# Patient Record
Sex: MALE | Race: WHITE | NOT HISPANIC OR LATINO | Employment: OTHER | URBAN - NONMETROPOLITAN AREA
[De-identification: names, ages, dates, MRNs, and addresses within clinical notes are randomized per-mention and may not be internally consistent; named-entity substitution may affect disease eponyms.]

---

## 2017-01-03 RX ORDER — LISINOPRIL 5 MG/1
TABLET ORAL
Qty: 30 TABLET | Refills: 0 | OUTPATIENT
Start: 2017-01-03

## 2017-01-03 RX ORDER — ASPIRIN 81 MG/1
81 TABLET ORAL DAILY
Qty: 60 TABLET | Refills: 8 | Status: SHIPPED | OUTPATIENT
Start: 2017-01-03 | End: 2017-09-18 | Stop reason: SDUPTHER

## 2017-01-03 RX ORDER — LISINOPRIL 5 MG/1
5 TABLET ORAL DAILY
Qty: 30 TABLET | Refills: 11 | Status: SHIPPED | OUTPATIENT
Start: 2017-01-03 | End: 2017-06-08 | Stop reason: SDUPTHER

## 2017-01-10 RX ORDER — VILAZODONE HYDROCHLORIDE 40 MG/1
40 TABLET ORAL DAILY
COMMUNITY
End: 2017-06-08 | Stop reason: SDUPTHER

## 2017-01-10 RX ORDER — GEMFIBROZIL 600 MG/1
600 TABLET, FILM COATED ORAL
COMMUNITY
End: 2017-06-08 | Stop reason: SDUPTHER

## 2017-01-10 RX ORDER — PRAVASTATIN SODIUM 40 MG
40 TABLET ORAL DAILY
COMMUNITY
End: 2017-06-08 | Stop reason: SDUPTHER

## 2017-01-10 RX ORDER — CARVEDILOL 12.5 MG/1
12.5 TABLET ORAL 2 TIMES DAILY WITH MEALS
COMMUNITY
End: 2017-06-08 | Stop reason: SDUPTHER

## 2017-06-08 ENCOUNTER — OFFICE VISIT (OUTPATIENT)
Dept: CARDIOLOGY | Facility: CLINIC | Age: 62
End: 2017-06-08

## 2017-06-08 VITALS
BODY MASS INDEX: 33.93 KG/M2 | WEIGHT: 256 LBS | HEIGHT: 73 IN | DIASTOLIC BLOOD PRESSURE: 76 MMHG | SYSTOLIC BLOOD PRESSURE: 120 MMHG | HEART RATE: 76 BPM

## 2017-06-08 DIAGNOSIS — E78.49 OTHER HYPERLIPIDEMIA: ICD-10-CM

## 2017-06-08 DIAGNOSIS — R00.2 PALPITATION: ICD-10-CM

## 2017-06-08 DIAGNOSIS — I34.0 NON-RHEUMATIC MITRAL REGURGITATION: ICD-10-CM

## 2017-06-08 DIAGNOSIS — I10 ESSENTIAL HYPERTENSION: Primary | ICD-10-CM

## 2017-06-08 PROCEDURE — 99213 OFFICE O/P EST LOW 20 MIN: CPT | Performed by: NURSE PRACTITIONER

## 2017-06-08 RX ORDER — VILAZODONE HYDROCHLORIDE 40 MG/1
40 TABLET ORAL DAILY
Qty: 30 TABLET | Refills: 8 | Status: SHIPPED | OUTPATIENT
Start: 2017-06-08 | End: 2017-12-12 | Stop reason: SDUPTHER

## 2017-06-08 RX ORDER — PRAVASTATIN SODIUM 40 MG
40 TABLET ORAL DAILY
Qty: 30 TABLET | Refills: 8 | Status: SHIPPED | OUTPATIENT
Start: 2017-06-08 | End: 2017-12-12 | Stop reason: SDUPTHER

## 2017-06-08 RX ORDER — CARVEDILOL 12.5 MG/1
12.5 TABLET ORAL 2 TIMES DAILY WITH MEALS
Qty: 60 TABLET | Refills: 8 | Status: SHIPPED | OUTPATIENT
Start: 2017-06-08 | End: 2017-12-12 | Stop reason: SDUPTHER

## 2017-06-08 RX ORDER — LISINOPRIL 5 MG/1
5 TABLET ORAL DAILY
Qty: 30 TABLET | Refills: 8 | Status: SHIPPED | OUTPATIENT
Start: 2017-06-08 | End: 2017-12-12 | Stop reason: SDUPTHER

## 2017-06-08 RX ORDER — GEMFIBROZIL 600 MG/1
600 TABLET, FILM COATED ORAL
Qty: 60 TABLET | Refills: 8 | Status: SHIPPED | OUTPATIENT
Start: 2017-06-08 | End: 2017-12-12 | Stop reason: SDUPTHER

## 2017-06-08 RX ORDER — SILDENAFIL 50 MG/1
50 TABLET, FILM COATED ORAL DAILY PRN
Qty: 2 TABLET | Refills: 0
Start: 2017-06-08 | End: 2017-12-12 | Stop reason: SDUPTHER

## 2017-06-08 NOTE — PROGRESS NOTES
Chief Complaint   Patient presents with   • Follow-up     6 month follow-up, no recent labs, patient brought medication list with visit.       Cardiac Complaints  none      Subjective   Adair Chowdhury is a 62 y.o. male with a history of palpitations for which he wore a Holter monitor in 2001. PVCs were noted and he has been managed with beta blocker. Cardiac workup in the past showed moderate disease of the LAD which has been managed medically. Today he comes to the office for a follow-up visit and no cardiac complaints are voiced. No recent labs have been done and order is requested.  He does have concerns with ED and samples of viagra are requested.  Refills of medication requested.      Cardiac History  Past Surgical History:   Procedure Laterality Date   • CARDIOVASCULAR STRESS TEST  10/13/2005    Stress- 8 min 96% THR. /100 Small Ischemia in Lateral Wall   • CARDIOVASCULAR STRESS TEST  10/14/2009    Stress- 8 min, 97% THR. Negative, Increase Coreg to 12.5 mg BID   • CARDIOVASCULAR STRESS TEST  02/24/2014    Stress- 7 min, 93% THR, 172/90. R/O Inferior Ischemia, Lisinopril added   • CATH LAB PROCEDURE  11/10/2005    Cath- Normal including Renal   • CATH LAB PROCEDURE  03/10/2014    Cath- EF 60%, 30% LAD.   • CONVERTED (HISTORICAL) HOLTER  05/30/2001    Holter-Frequent PVC's   • ECHO - CONVERTED  08/29/2006    Echo- Normal (Dr. Resendez)   • ECHO - CONVERTED  10/14/2009    Echo-EF >60%, aortic root- 4.2cm   • ECHO - CONVERTED  02/24/2014    Echo-EF 60-65%   • HERNIA REPAIR     • TONSILLECTOMY         Current Outpatient Prescriptions   Medication Sig Dispense Refill   • aspirin 81 MG EC tablet Take 1 tablet by mouth Daily. 2 tablets once daily 60 tablet 8   • carvedilol (COREG) 12.5 MG tablet Take 1 tablet by mouth 2 (Two) Times a Day With Meals. 60 tablet 8   • gemfibrozil (LOPID) 600 MG tablet Take 1 tablet by mouth 2 (Two) Times a Day Before Meals. 60 tablet 8   • lisinopril (PRINIVIL,ZESTRIL) 5 MG tablet  Take 1 tablet by mouth Daily. 30 tablet 8   • mometasone (NASONEX) 50 MCG/ACT nasal spray 2 sprays into each nostril daily.     • omeprazole (PriLOSEC) 20 MG capsule Take 20 mg by mouth daily.     • pravastatin (PRAVACHOL) 40 MG tablet Take 1 tablet by mouth Daily. 30 tablet 8   • vilazodone (VIIBRYD) 40 MG tablet tablet Take 1 tablet by mouth Daily. 30 tablet 8   • sildenafil (VIAGRA) 50 MG tablet Take 1 tablet by mouth Daily As Needed for erectile dysfunction. 2 tablet 0     No current facility-administered medications for this visit.        Review of patient's allergies indicates no known allergies.    Past Medical History:   Diagnosis Date   • Abnormal pulse oximetry 03/10/2011    Overnight pulse ox- Negative   • H pylori ulcer    • Heart murmur    • Heart murmur    • History of hernia surgery    • History of tonsillectomy    • Hyperlipidemia    • Hypertension    • LV dysfunction     mild       Social History     Social History   • Marital status:      Spouse name: N/A   • Number of children: N/A   • Years of education: N/A     Occupational History   • Not on file.     Social History Main Topics   • Smoking status: Never Smoker   • Smokeless tobacco: Never Used   • Alcohol use No   • Drug use: No   • Sexual activity: Not on file     Other Topics Concern   • Not on file     Social History Narrative       Family History   Problem Relation Age of Onset   • Heart disease Mother    • Hypertension Mother    • Hyperlipidemia Mother    • Heart disease Father    • Heart attack Father    • Hyperlipidemia Father    • Hypertension Father    • Heart failure Father    • Diabetes Other    • Hypertension Other    • Hyperlipidemia Other    • No Known Problems Child        Review of Systems   Constitutional: Negative.    HENT: Negative.    Respiratory: Negative.    Cardiovascular: Negative.    Endocrine: Negative.    Neurological: Negative.    Psychiatric/Behavioral: Negative.        DiabetesNo  Thyroidnormal    Objective  "    /76 (BP Location: Right arm)  Pulse 76  Ht 73\" (185.4 cm)  Wt 256 lb (116 kg)  BMI 33.78 kg/m2    Physical Exam   Constitutional: He is oriented to person, place, and time. He appears well-developed and well-nourished.   HENT:   Head: Normocephalic and atraumatic.   Eyes: EOM are normal. Pupils are equal, round, and reactive to light.   Neck: Normal range of motion. Neck supple.   Cardiovascular: Normal rate and regular rhythm.    Pulmonary/Chest: Effort normal and breath sounds normal.   Abdominal: Soft.   Musculoskeletal: Normal range of motion.   Neurological: He is alert and oriented to person, place, and time.   Skin: Skin is warm and dry.   Psychiatric: He has a normal mood and affect.       Procedures    Assessment/Plan     HR and BP are both stable today.  No new cardiac testing will be advised today as no new concerns voiced.  Lab order will be given with more recommendations to follow.  We will consider repeat cardiac workup at next visit since length of time since last testing.  Cardiac refills sent.  Good cardiac diet and activity as tolerated advised.  Samples of viagra provided per request for ED.  6 month follow up advised or sooner if needed.      Problems Addressed this Visit        Cardiovascular and Mediastinum    Hyperlipemia    Relevant Medications    pravastatin (PRAVACHOL) 40 MG tablet    gemfibrozil (LOPID) 600 MG tablet    Other Relevant Orders    CBC & Differential    Comprehensive Metabolic Panel    Lipid Panel    TSH    HTN (hypertension) - Primary    Relevant Medications    carvedilol (COREG) 12.5 MG tablet    lisinopril (PRINIVIL,ZESTRIL) 5 MG tablet    Other Relevant Orders    CBC & Differential    Comprehensive Metabolic Panel    Lipid Panel    TSH    Palpitation    Relevant Orders    CBC & Differential    Comprehensive Metabolic Panel    Lipid Panel    TSH    Mitral regurgitation    Relevant Medications    carvedilol (COREG) 12.5 MG tablet    sildenafil (VIAGRA) 50 MG " tablet    Other Relevant Orders    CBC & Differential    Comprehensive Metabolic Panel    Lipid Panel    TSH                  Electronically signed by GLADYS Akers June 8, 2017 4:24 PM

## 2017-09-18 RX ORDER — ASPIRIN 81 MG/1
TABLET ORAL
Qty: 60 TABLET | Refills: 8 | Status: SHIPPED | OUTPATIENT
Start: 2017-09-18 | End: 2018-06-15 | Stop reason: SDUPTHER

## 2017-12-12 ENCOUNTER — OFFICE VISIT (OUTPATIENT)
Dept: CARDIOLOGY | Facility: CLINIC | Age: 62
End: 2017-12-12

## 2017-12-12 VITALS
HEART RATE: 72 BPM | HEIGHT: 73 IN | BODY MASS INDEX: 33.4 KG/M2 | WEIGHT: 252 LBS | SYSTOLIC BLOOD PRESSURE: 120 MMHG | DIASTOLIC BLOOD PRESSURE: 82 MMHG

## 2017-12-12 DIAGNOSIS — I25.10 CORONARY ARTERY DISEASE INVOLVING NATIVE CORONARY ARTERY OF NATIVE HEART WITHOUT ANGINA PECTORIS: ICD-10-CM

## 2017-12-12 DIAGNOSIS — E78.49 OTHER HYPERLIPIDEMIA: ICD-10-CM

## 2017-12-12 DIAGNOSIS — I10 ESSENTIAL HYPERTENSION: Primary | ICD-10-CM

## 2017-12-12 DIAGNOSIS — E66.09 CLASS 1 OBESITY DUE TO EXCESS CALORIES WITHOUT SERIOUS COMORBIDITY WITH BODY MASS INDEX (BMI) OF 33.0 TO 33.9 IN ADULT: ICD-10-CM

## 2017-12-12 PROBLEM — E66.811 CLASS 1 OBESITY DUE TO EXCESS CALORIES WITHOUT SERIOUS COMORBIDITY WITH BODY MASS INDEX (BMI) OF 33.0 TO 33.9 IN ADULT: Status: ACTIVE | Noted: 2017-12-12

## 2017-12-12 PROCEDURE — 99213 OFFICE O/P EST LOW 20 MIN: CPT | Performed by: NURSE PRACTITIONER

## 2017-12-12 RX ORDER — GEMFIBROZIL 600 MG/1
600 TABLET, FILM COATED ORAL
Qty: 60 TABLET | Refills: 8 | Status: SHIPPED | OUTPATIENT
Start: 2017-12-12 | End: 2018-06-13 | Stop reason: SDUPTHER

## 2017-12-12 RX ORDER — CARVEDILOL 12.5 MG/1
12.5 TABLET ORAL 2 TIMES DAILY WITH MEALS
Qty: 60 TABLET | Refills: 8 | Status: SHIPPED | OUTPATIENT
Start: 2017-12-12 | End: 2018-06-13 | Stop reason: SDUPTHER

## 2017-12-12 RX ORDER — SILDENAFIL 50 MG/1
50 TABLET, FILM COATED ORAL DAILY PRN
Qty: 2 TABLET | Refills: 12
Start: 2017-12-12 | End: 2018-06-13

## 2017-12-12 RX ORDER — VILAZODONE HYDROCHLORIDE 40 MG/1
40 TABLET ORAL DAILY
Qty: 30 TABLET | Refills: 8 | Status: SHIPPED | OUTPATIENT
Start: 2017-12-12 | End: 2018-02-19 | Stop reason: SDUPTHER

## 2017-12-12 RX ORDER — PRAVASTATIN SODIUM 40 MG
40 TABLET ORAL DAILY
Qty: 30 TABLET | Refills: 8 | Status: SHIPPED | OUTPATIENT
Start: 2017-12-12 | End: 2018-06-13 | Stop reason: SDUPTHER

## 2017-12-12 RX ORDER — LISINOPRIL 5 MG/1
5 TABLET ORAL DAILY
Qty: 30 TABLET | Refills: 8 | Status: SHIPPED | OUTPATIENT
Start: 2017-12-12 | End: 2018-06-13 | Stop reason: SDUPTHER

## 2017-12-12 NOTE — PATIENT INSTRUCTIONS
Calorie Counting for Weight Loss  Calories are energy you get from the things you eat and drink. Your body uses this energy to keep you going throughout the day. The number of calories you eat affects your weight. When you eat more calories than your body needs, your body stores the extra calories as fat. When you eat fewer calories than your body needs, your body burns fat to get the energy it needs.  Calorie counting means keeping track of how many calories you eat and drink each day. If you make sure to eat fewer calories than your body needs, you should lose weight. In order for calorie counting to work, you will need to eat the number of calories that are right for you in a day to lose a healthy amount of weight per week. A healthy amount of weight to lose per week is usually 1-2 lb (0.5-0.9 kg). A dietitian can determine how many calories you need in a day and give you suggestions on how to reach your calorie goal.   WHAT IS MY MY PLAN?  My goal is to have __________ calories per day.   If I have this many calories per day, I should lose around __________ pounds per week.  WHAT DO I NEED TO KNOW ABOUT CALORIE COUNTING?  In order to meet your daily calorie goal, you will need to:  · Find out how many calories are in each food you would like to eat. Try to do this before you eat.  · Decide how much of the food you can eat.  · Write down what you ate and how many calories it had. Doing this is called keeping a food log.  WHERE DO I FIND CALORIE INFORMATION?  The number of calories in a food can be found on a Nutrition Facts label. Note that all the information on a label is based on a specific serving of the food. If a food does not have a Nutrition Facts label, try to look up the calories online or ask your dietitian for help.  HOW DO I DECIDE HOW MUCH TO EAT?  To decide how much of the food you can eat, you will need to consider both the number of calories in one serving and the size of one serving. This  information can be found on the Nutrition Facts label. If a food does not have a Nutrition Facts label, look up the information online or ask your dietitian for help.  Remember that calories are listed per serving. If you choose to have more than one serving of a food, you will have to multiply the calories per serving by the amount of servings you plan to eat. For example, the label on a package of bread might say that a serving size is 1 slice and that there are 90 calories in a serving. If you eat 1 slice, you will have eaten 90 calories. If you eat 2 slices, you will have eaten 180 calories.  HOW DO I KEEP A FOOD LOG?  After each meal, record the following information in your food log:  · What you ate.  · How much of it you ate.  · How many calories it had.  · Then, add up your calories.  Keep your food log near you, such as in a small notebook in your pocket. Another option is to use a mobile apoorva or website. Some programs will calculate calories for you and show you how many calories you have left each time you add an item to the log.  WHAT ARE SOME CALORIE COUNTING TIPS?  · Use your calories on foods and drinks that will fill you up and not leave you hungry. Some examples of this include foods like nuts and nut butters, vegetables, lean proteins, and high-fiber foods (more than 5 g fiber per serving).  · Eat nutritious foods and avoid empty calories. Empty calories are calories you get from foods or beverages that do not have many nutrients, such as candy and soda. It is better to have a nutritious high-calorie food (such as an avocado) than a food with few nutrients (such as a bag of chips).  · Know how many calories are in the foods you eat most often. This way, you do not have to look up how many calories they have each time you eat them.  · Look out for foods that may seem like low-calorie foods but are really high-calorie foods, such as baked goods, soda, and fat-free candy.  · Pay attention to calories  in drinks. Drinks such as sodas, specialty coffee drinks, alcohol, and juices have a lot of calories yet do not fill you up. Choose low-calorie drinks like water and diet drinks.  · Focus your calorie counting efforts on higher calorie items. Logging the calories in a garden salad that contains only vegetables is less important than calculating the calories in a milk shake.  · Find a way of tracking calories that works for you. Get creative. Most people who are successful find ways to keep track of how much they eat in a day, even if they do not count every calorie.  WHAT ARE SOME PORTION CONTROL TIPS?  · Know how many calories are in a serving. This will help you know how many servings of a certain food you can have.  · Use a measuring cup to measure serving sizes. This is helpful when you start out. With time, you will be able to estimate serving sizes for some foods.  · Take some time to put servings of different foods on your favorite plates, bowls, and cups so you know what a serving looks like.  · Try not to eat straight from a bag or box. Doing this can lead to overeating. Put the amount you would like to eat in a cup or on a plate to make sure you are eating the right portion.  · Use smaller plates, glasses, and bowls to prevent overeating. This is a quick and easy way to practice portion control. If your plate is smaller, less food can fit on it.  · Try not to multitask while eating, such as watching TV or using your computer. If it is time to eat, sit down at a table and enjoy your food. Doing this will help you to start recognizing when you are full. It will also make you more aware of what and how much you are eating.  HOW CAN I CALORIE COUNT WHEN EATING OUT?  · Ask for smaller portion sizes or child-sized portions.  · Consider sharing an entree and sides instead of getting your own entree.  · If you get your own entree, eat only half. Ask for a box at the beginning of your meal and put the rest of your  "entree in it so you are not tempted to eat it.  · Look for the calories on the menu. If calories are listed, choose the lower calorie options.  · Choose dishes that include vegetables, fruits, whole grains, low-fat dairy products, and lean protein. Focusing on smart food choices from each of the 5 food groups can help you stay on track at restaurants.  · Choose items that are boiled, broiled, grilled, or steamed.  · Choose water, milk, unsweetened iced tea, or other drinks without added sugars. If you want an alcoholic beverage, choose a lower calorie option. For example, a regular anderson can have up to 700 calories and a glass of wine has around 150.  · Stay away from items that are buttered, battered, fried, or served with cream sauce. Items labeled \"crispy\" are usually fried, unless stated otherwise.  · Ask for dressings, sauces, and syrups on the side. These are usually very high in calories, so do not eat much of them.  · Watch out for salads. Many people think salads are a healthy option, but this is often not the case. Many salads come with buchanan, fried chicken, lots of cheese, fried chips, and dressing. All of these items have a lot of calories. If you want a salad, choose a garden salad and ask for grilled meats or steak. Ask for the dressing on the side, or ask for olive oil and vinegar or lemon to use as dressing.  · Estimate how many servings of a food you are given. For example, a serving of cooked rice is ½ cup or about the size of half a tennis ball or one cupcake wrapper. Knowing serving sizes will help you be aware of how much food you are eating at restaurants. The list below tells you how big or small some common portion sizes are based on everyday objects.    1 oz--4 stacked dice.    3 oz--1 deck of cards.    1 tsp--1 dice.    1 Tbsp--½ a Ping-Pong ball.    2 Tbsp--1 Ping-Pong ball.    ½ cup--1 tennis ball or 1 cupcake wrapper.    1 cup--1 baseball.     This information is not intended to " replace advice given to you by your health care provider. Make sure you discuss any questions you have with your health care provider.     Document Released: 12/18/2006 Document Revised: 01/08/2016 Document Reviewed: 10/23/2014  [employer] Patient Education ©2017 [employer]

## 2017-12-12 NOTE — PROGRESS NOTES
Chief Complaint   Patient presents with   • Follow-up     cardiac management,    • Med Refill     request 30 day refills on cardiac medication's.        Subjective       Adair Chowdhury is a 62 y.o. male  with a history of palpitations for which he wore a Holter monitor in 2001. PVCs were noted and he has been managed with beta blocker. Cardiac workup in the past showed moderate disease of the LAD which has been managed medically. June 2017 lab report noted. . TSH in normal range.   Today he comes to the office for a follow up visit and no complaints are voiced. He maintains his normal activities without symptoms. No medication changes reported.     HPI         Cardiac History:    Past Surgical History:   Procedure Laterality Date   • CARDIOVASCULAR STRESS TEST  10/13/2005    Stress- 8 min 96% THR. /100 Small Ischemia in Lateral Wall   • CARDIOVASCULAR STRESS TEST  10/14/2009    Stress- 8 min, 97% THR. Negative, Increase Coreg to 12.5 mg BID   • CARDIOVASCULAR STRESS TEST  02/24/2014    Stress- 7 min, 93% THR, 172/90. R/O Inferior Ischemia, Lisinopril added   • CATH LAB PROCEDURE  11/10/2005    Cath- Normal including Renal   • CATH LAB PROCEDURE  03/10/2014    Cath- EF 60%, 30% LAD.   • CONVERTED (HISTORICAL) HOLTER  05/30/2001    Holter-Frequent PVC's   • ECHO - CONVERTED  08/29/2006    Echo- Normal (Dr. Resendez)   • ECHO - CONVERTED  10/14/2009    Echo-EF >60%, aortic root- 4.2cm   • ECHO - CONVERTED  02/24/2014    Echo-EF 60-65%   • HERNIA REPAIR     • TONSILLECTOMY         Current Outpatient Prescriptions   Medication Sig Dispense Refill   • aspirin 81 MG EC tablet TAKE TWO TABLETS BY MOUTH ONCE DAILY 60 tablet 8   • carvedilol (COREG) 12.5 MG tablet Take 1 tablet by mouth 2 (Two) Times a Day With Meals. 60 tablet 8   • gemfibrozil (LOPID) 600 MG tablet Take 1 tablet by mouth 2 (Two) Times a Day Before Meals. 60 tablet 8   • lisinopril (PRINIVIL,ZESTRIL) 5 MG tablet Take 1 tablet by mouth Daily. 30  tablet 8   • mometasone (NASONEX) 50 MCG/ACT nasal spray 2 sprays into each nostril daily.     • omeprazole (PriLOSEC) 20 MG capsule Take 20 mg by mouth daily.     • pravastatin (PRAVACHOL) 40 MG tablet Take 1 tablet by mouth Daily. 30 tablet 8   • sildenafil (VIAGRA) 50 MG tablet Take 1 tablet by mouth Daily As Needed for erectile dysfunction. 2 tablet 12   • vilazodone (VIIBRYD) 40 MG tablet tablet Take 1 tablet by mouth Daily. 30 tablet 8     No current facility-administered medications for this visit.        Review of patient's allergies indicates no known allergies.    Past Medical History:   Diagnosis Date   • Abnormal pulse oximetry 03/10/2011    Overnight pulse ox- Negative   • H pylori ulcer    • Heart murmur    • Heart murmur    • History of hernia surgery    • History of tonsillectomy    • Hyperlipidemia    • Hypertension    • LV dysfunction     mild       Social History     Social History   • Marital status:      Spouse name: N/A   • Number of children: N/A   • Years of education: N/A     Occupational History   • Not on file.     Social History Main Topics   • Smoking status: Never Smoker   • Smokeless tobacco: Never Used   • Alcohol use No   • Drug use: No   • Sexual activity: Not on file     Other Topics Concern   • Not on file     Social History Narrative       Family History   Problem Relation Age of Onset   • Heart disease Mother    • Hypertension Mother    • Hyperlipidemia Mother    • Heart disease Father    • Heart attack Father    • Hyperlipidemia Father    • Hypertension Father    • Heart failure Father    • Diabetes Other    • Hypertension Other    • Hyperlipidemia Other    • No Known Problems Child        Review of Systems   Constitution: Negative for decreased appetite and malaise/fatigue.   HENT: Positive for congestion (sinus). Negative for sore throat.    Eyes: Negative for blurred vision.   Cardiovascular: Negative for chest pain, dyspnea on exertion, irregular heartbeat, leg  "swelling, palpitations and paroxysmal nocturnal dyspnea.   Respiratory: Negative for shortness of breath and sleep disturbances due to breathing.    Endocrine: Negative for cold intolerance and heat intolerance.   Hematologic/Lymphatic: Negative for adenopathy. Does not bruise/bleed easily.   Skin: Negative for itching and nail changes.   Musculoskeletal: Positive for arthritis. Negative for myalgias.   Gastrointestinal: Negative for abdominal pain, dysphagia, heartburn, melena and nausea.   Genitourinary: Negative for dysuria, frequency and hematuria.   Neurological: Negative for dizziness, light-headedness, seizures and vertigo.   Psychiatric/Behavioral: Negative for altered mental status. The patient does not have insomnia and is not nervous/anxious.    Allergic/Immunologic: Negative for environmental allergies and hives.    Diabetes- No  Thyroid-normal    Objective     /82 (BP Location: Right arm)  Pulse 72  Ht 185.4 cm (73\")  Wt 114 kg (252 lb)  BMI 33.25 kg/m2    Physical Exam   Constitutional: He is oriented to person, place, and time. He appears well-nourished.   HENT:   Head: Normocephalic.   Eyes: Conjunctivae are normal. Pupils are equal, round, and reactive to light.   Neck: Normal range of motion. Neck supple. No JVD present. Carotid bruit is not present.   Cardiovascular: Normal rate, regular rhythm, S1 normal, S2 normal and normal pulses.    No murmur heard.  Pulmonary/Chest: Effort normal and breath sounds normal. He has no wheezes. He has no rales.   Abdominal: Soft. Bowel sounds are normal. There is no tenderness.   Musculoskeletal: Normal range of motion. He exhibits no edema or tenderness.   Neurological: He is alert and oriented to person, place, and time.   Skin: Skin is warm and dry. No rash noted. No pallor.   Psychiatric: He has a normal mood and affect. His behavior is normal. Judgment and thought content normal.   Vitals reviewed.     Procedures        Assessment/Plan      Adair" was seen today for follow-up and med refill.    Diagnoses and all orders for this visit:    Essential hypertension    Other hyperlipidemia    Coronary artery disease involving native coronary artery of native heart without angina pectoris    Class 1 obesity due to excess calories without serious comorbidity with body mass index (BMI) of 33.0 to 33.9 in adult    Other orders  -     carvedilol (COREG) 12.5 MG tablet; Take 1 tablet by mouth 2 (Two) Times a Day With Meals.  -     gemfibrozil (LOPID) 600 MG tablet; Take 1 tablet by mouth 2 (Two) Times a Day Before Meals.  -     lisinopril (PRINIVIL,ZESTRIL) 5 MG tablet; Take 1 tablet by mouth Daily.  -     pravastatin (PRAVACHOL) 40 MG tablet; Take 1 tablet by mouth Daily.  -     sildenafil (VIAGRA) 50 MG tablet; Take 1 tablet by mouth Daily As Needed for erectile dysfunction.  -     vilazodone (VIIBRYD) 40 MG tablet tablet; Take 1 tablet by mouth Daily.        He appears stable from a cardiac standpoint. Blood pressure, heart rate and rhythm are normal. No cardiac workup advised at this time. We reviewed his labs from June. His LDL was 110 and triglycerides normal. Continue statin and fenofibrate. Diet for weight loss information given to him. He has been trying to manage diet better and has lost 5 pounds. Next visit will plan to repeat labs unless done sooner.            Electronically signed by GLADYS Alvarez,  December 12, 2017 9:01 AM

## 2018-02-19 ENCOUNTER — PRIOR AUTHORIZATION (OUTPATIENT)
Dept: CARDIOLOGY | Facility: CLINIC | Age: 63
End: 2018-02-19

## 2018-02-19 RX ORDER — VILAZODONE HYDROCHLORIDE 40 MG/1
40 TABLET ORAL DAILY
Qty: 30 TABLET | Refills: 11 | Status: SHIPPED | OUTPATIENT
Start: 2018-02-19 | End: 2018-06-13 | Stop reason: SDUPTHER

## 2018-06-13 ENCOUNTER — OFFICE VISIT (OUTPATIENT)
Dept: CARDIOLOGY | Facility: CLINIC | Age: 63
End: 2018-06-13

## 2018-06-13 VITALS
DIASTOLIC BLOOD PRESSURE: 88 MMHG | WEIGHT: 244 LBS | BODY MASS INDEX: 32.34 KG/M2 | HEIGHT: 73 IN | HEART RATE: 72 BPM | SYSTOLIC BLOOD PRESSURE: 130 MMHG

## 2018-06-13 DIAGNOSIS — I34.0 NON-RHEUMATIC MITRAL REGURGITATION: ICD-10-CM

## 2018-06-13 DIAGNOSIS — I10 ESSENTIAL HYPERTENSION: Primary | ICD-10-CM

## 2018-06-13 DIAGNOSIS — R35.1 NOCTURIA: ICD-10-CM

## 2018-06-13 DIAGNOSIS — Z79.899 MEDICATION MANAGEMENT: ICD-10-CM

## 2018-06-13 DIAGNOSIS — E78.2 MIXED HYPERLIPIDEMIA: ICD-10-CM

## 2018-06-13 DIAGNOSIS — I25.10 CORONARY ARTERY DISEASE INVOLVING NATIVE CORONARY ARTERY OF NATIVE HEART WITHOUT ANGINA PECTORIS: ICD-10-CM

## 2018-06-13 DIAGNOSIS — Z12.5 SCREENING PSA (PROSTATE SPECIFIC ANTIGEN): ICD-10-CM

## 2018-06-13 DIAGNOSIS — E78.00 PURE HYPERCHOLESTEROLEMIA: ICD-10-CM

## 2018-06-13 PROCEDURE — 99214 OFFICE O/P EST MOD 30 MIN: CPT | Performed by: NURSE PRACTITIONER

## 2018-06-13 RX ORDER — GEMFIBROZIL 600 MG/1
600 TABLET, FILM COATED ORAL
Qty: 60 TABLET | Refills: 11 | Status: SHIPPED | OUTPATIENT
Start: 2018-06-13 | End: 2018-12-13 | Stop reason: SDUPTHER

## 2018-06-13 RX ORDER — VILAZODONE HYDROCHLORIDE 40 MG/1
40 TABLET ORAL DAILY
Qty: 30 TABLET | Refills: 11 | Status: SHIPPED | OUTPATIENT
Start: 2018-06-13 | End: 2018-12-13 | Stop reason: SDUPTHER

## 2018-06-13 RX ORDER — SILDENAFIL 100 MG/1
100 TABLET, FILM COATED ORAL AS NEEDED
Qty: 8 TABLET | Refills: 6 | Status: SHIPPED | OUTPATIENT
Start: 2018-06-13 | End: 2018-12-13 | Stop reason: ALTCHOICE

## 2018-06-13 RX ORDER — PRAVASTATIN SODIUM 40 MG
40 TABLET ORAL DAILY
Qty: 30 TABLET | Refills: 11 | Status: SHIPPED | OUTPATIENT
Start: 2018-06-13 | End: 2018-12-13 | Stop reason: SDUPTHER

## 2018-06-13 RX ORDER — LISINOPRIL 5 MG/1
5 TABLET ORAL DAILY
Qty: 30 TABLET | Refills: 11 | Status: SHIPPED | OUTPATIENT
Start: 2018-06-13 | End: 2018-12-13 | Stop reason: SDUPTHER

## 2018-06-13 RX ORDER — CARVEDILOL 12.5 MG/1
12.5 TABLET ORAL 2 TIMES DAILY WITH MEALS
Qty: 60 TABLET | Refills: 11 | Status: SHIPPED | OUTPATIENT
Start: 2018-06-13 | End: 2018-12-13 | Stop reason: SDUPTHER

## 2018-06-13 NOTE — PROGRESS NOTES
Chief Complaint   Patient presents with   • Follow-up     For cardiac management. Patient is on aspirin. Last lab work was done on 06/09/17 per Fidelia, in chart under media. Reports he occasionally has palpitations, but reports that they have slowed down.    • Med Refill     Needs refills on cardiac medications. 30 day supplys to [facility] in [place].        Subjective       Adair Chowdhury is a 63 y.o. male   with a history of palpitations for which he wore a Holter monitor in 2001. PVCs were noted and he has been managed with beta blocker. Cardiac workup in 2014 showed moderate disease of the LAD which has been managed medically. June 2017 lab report showed . TSH in normal range.   Today he comes to the office for a follow up visit and denies chest pain or shortness of breath. Occasionally, he feels palpitations that are brief and infrequent, same as before. No recent medication changes reported. He maintains his normal activities without issue.     HPI     Cardiac History:    Past Surgical History:   Procedure Laterality Date   • CARDIOVASCULAR STRESS TEST  10/13/2005    Stress- 8 min 96% THR. /100 Small Ischemia in Lateral Wall   • CARDIOVASCULAR STRESS TEST  10/14/2009    Stress- 8 min, 97% THR. Negative, Increase Coreg to 12.5 mg BID   • CARDIOVASCULAR STRESS TEST  02/24/2014    Stress- 7 min, 93% THR, 172/90. R/O Inferior Ischemia, Lisinopril added   • CATH LAB PROCEDURE  11/10/2005    Cath- Normal including Renal   • CATH LAB PROCEDURE  03/10/2014    Cath- EF 60%, 30% LAD.   • CONVERTED (HISTORICAL) HOLTER  05/30/2001    Holter-Frequent PVC's   • ECHO - CONVERTED  08/29/2006    Echo- Normal (Dr. Resendez)   • ECHO - CONVERTED  10/14/2009    Echo-EF >60%, aortic root- 4.2cm   • ECHO - CONVERTED  02/24/2014    Echo-EF 60-65%   • HERNIA REPAIR     • TONSILLECTOMY         Current Outpatient Prescriptions   Medication Sig Dispense Refill   • aspirin 81 MG EC tablet TAKE TWO TABLETS BY MOUTH  ONCE DAILY 60 tablet 8   • carvedilol (COREG) 12.5 MG tablet Take 1 tablet by mouth 2 (Two) Times a Day With Meals. 60 tablet 11   • gemfibrozil (LOPID) 600 MG tablet Take 1 tablet by mouth 2 (Two) Times a Day Before Meals. 60 tablet 11   • lisinopril (PRINIVIL,ZESTRIL) 5 MG tablet Take 1 tablet by mouth Daily. 30 tablet 11   • mometasone (NASONEX) 50 MCG/ACT nasal spray 2 sprays into each nostril daily.     • omeprazole (PriLOSEC) 20 MG capsule Take 20 mg by mouth daily.     • pravastatin (PRAVACHOL) 40 MG tablet Take 1 tablet by mouth Daily. 30 tablet 11   • vilazodone (VIIBRYD) 40 MG tablet tablet Take 1 tablet by mouth Daily. 30 tablet 11   • sildenafil (VIAGRA) 100 MG tablet Take 1 tablet by mouth As Needed for erectile dysfunction. 8 tablet 6     No current facility-administered medications for this visit.        Patient has no known allergies.    Past Medical History:   Diagnosis Date   • Abnormal pulse oximetry 03/10/2011    Overnight pulse ox- Negative   • H pylori ulcer    • Heart murmur    • Heart murmur    • History of hernia surgery    • History of tonsillectomy    • Hyperlipidemia    • Hypertension    • LV dysfunction     mild       Social History     Social History   • Marital status:      Spouse name: N/A   • Number of children: N/A   • Years of education: N/A     Occupational History   • Not on file.     Social History Main Topics   • Smoking status: Never Smoker   • Smokeless tobacco: Never Used   • Alcohol use No   • Drug use: No   • Sexual activity: Not on file     Other Topics Concern   • Not on file     Social History Narrative   • No narrative on file       Family History   Problem Relation Age of Onset   • Heart disease Mother    • Hypertension Mother    • Hyperlipidemia Mother    • Heart disease Father    • Heart attack Father    • Hyperlipidemia Father    • Hypertension Father    • Heart failure Father    • Diabetes Other    • Hypertension Other    • Hyperlipidemia Other    • No  "Known Problems Child        Review of Systems   Constitution: Negative for decreased appetite and malaise/fatigue.   HENT: Positive for congestion (mild sinus, relates to allergies). Negative for hoarse voice and nosebleeds.    Eyes: Negative for blurred vision and redness.   Cardiovascular: Positive for palpitations. Negative for chest pain, dyspnea on exertion, leg swelling and near-syncope.   Respiratory: Negative for shortness of breath and snoring.    Endocrine: Negative for polydipsia, polyphagia and polyuria.   Hematologic/Lymphatic: Negative for adenopathy. Does not bruise/bleed easily.   Skin: Negative for color change, dry skin and itching.   Musculoskeletal: Positive for joint pain (\"arthritis\" mild). Negative for muscle cramps.   Gastrointestinal: Negative for abdominal pain, change in bowel habit, dysphagia, heartburn, melena and nausea.   Genitourinary: Negative for dysuria and hematuria.   Neurological: Negative for dizziness and light-headedness.   Psychiatric/Behavioral: Negative for altered mental status. The patient does not have insomnia.         Objective     /88 (BP Location: Left arm)   Pulse 72   Ht 185.4 cm (72.99\")   Wt 111 kg (244 lb)   BMI 32.20 kg/m²     Physical Exam   Constitutional: He is oriented to person, place, and time. He appears well-nourished.   HENT:   Head: Normocephalic.   Eyes: Conjunctivae are normal. Pupils are equal, round, and reactive to light.   Neck: Normal range of motion. Neck supple. No JVD present. Carotid bruit is not present.   Cardiovascular: Normal rate, regular rhythm, S1 normal and S2 normal.    Murmur heard.   Systolic murmur is present with a grade of 2/6   Pulmonary/Chest: Effort normal and breath sounds normal. He has no wheezes. He has no rales.   Abdominal: Soft. Bowel sounds are normal.   Musculoskeletal: Normal range of motion. He exhibits no edema.   Neurological: He is alert and oriented to person, place, and time.   Skin: Skin is warm " and dry. No pallor.   Psychiatric: He has a normal mood and affect. His behavior is normal.   Vitals reviewed.     Procedures: none today      Assessment/Plan      Adair was seen today for follow-up and med refill.    Diagnoses and all orders for this visit:    Essential hypertension  -     Comprehensive Metabolic Panel; Future  -     CBC (No Diff); Future    Pure hypercholesterolemia  -     Lipid Panel; Future    Non-rheumatic mitral regurgitation    Coronary artery disease involving native coronary artery of native heart without angina pectoris    Mixed hyperlipidemia  -     Comprehensive Metabolic Panel; Future  -     Lipid Panel; Future    Medication management  -     Comprehensive Metabolic Panel; Future  -     CBC (No Diff); Future  -     Lipid Panel; Future    Screening PSA (prostate specific antigen)  -     PSA Screen; Future    Nocturia  -     PSA Screen; Future    Other orders  -     sildenafil (VIAGRA) 100 MG tablet; Take 1 tablet by mouth As Needed for erectile dysfunction.  -     pravastatin (PRAVACHOL) 40 MG tablet; Take 1 tablet by mouth Daily.  -     lisinopril (PRINIVIL,ZESTRIL) 5 MG tablet; Take 1 tablet by mouth Daily.  -     gemfibrozil (LOPID) 600 MG tablet; Take 1 tablet by mouth 2 (Two) Times a Day Before Meals.  -     carvedilol (COREG) 12.5 MG tablet; Take 1 tablet by mouth 2 (Two) Times a Day With Meals.  -     vilazodone (VIIBRYD) 40 MG tablet tablet; Take 1 tablet by mouth Daily.        Patient's Body mass index is 32.2 kg/m². BMI is above normal parameters. Recommendations include: nutrition counseling. Diet for weight loss, BMI and DASH diet information given to him. His weight is down about 10 pounds and I encouraged his efforts. He maintains normal activity. I encouraged daily low impact cardiac exercise.      His diastolic blood pressure slightly increased compared to his normal. He agrees to monitor. No medication changes advised today. Refills given as noted above.    A lab order  given as noted above. For lipid management he is taking Lopid and statin without side effects noted. Continue same, further recommendations based on lab results.     His last PSA in 2016. A repeat PSA advised.     He remains asymptomatic. No repeat cardiac testing ordered today as patient has no cardiac concerns and wants to avoid out of pocket cost.     A 6 month follow up visit scheduled.            Electronically signed by GLADYS Alvarez,  June 13, 2018 4:46 PM

## 2018-06-13 NOTE — PATIENT INSTRUCTIONS
"DASH Eating Plan  DASH stands for \"Dietary Approaches to Stop Hypertension.\" The DASH eating plan is a healthy eating plan that has been shown to reduce high blood pressure (hypertension). It may also reduce your risk for type 2 diabetes, heart disease, and stroke. The DASH eating plan may also help with weight loss.  What are tips for following this plan?  General guidelines   · Avoid eating more than 2,300 mg (milligrams) of salt (sodium) a day. If you have hypertension, you may need to reduce your sodium intake to 1,500 mg a day.  · Limit alcohol intake to no more than 1 drink a day for nonpregnant women and 2 drinks a day for men. One drink equals 12 oz of beer, 5 oz of wine, or 1½ oz of hard liquor.  · Work with your health care provider to maintain a healthy body weight or to lose weight. Ask what an ideal weight is for you.  · Get at least 30 minutes of exercise that causes your heart to beat faster (aerobic exercise) most days of the week. Activities may include walking, swimming, or biking.  · Work with your health care provider or diet and nutrition specialist (dietitian) to adjust your eating plan to your individual calorie needs.  Reading food labels   · Check food labels for the amount of sodium per serving. Choose foods with less than 5 percent of the Daily Value of sodium. Generally, foods with less than 300 mg of sodium per serving fit into this eating plan.  · To find whole grains, look for the word \"whole\" as the first word in the ingredient list.  Shopping   · Buy products labeled as \"low-sodium\" or \"no salt added.\"  · Buy fresh foods. Avoid canned foods and premade or frozen meals.  Cooking   · Avoid adding salt when cooking. Use salt-free seasonings or herbs instead of table salt or sea salt. Check with your health care provider or pharmacist before using salt substitutes.  · Do not echevarria foods. Cook foods using healthy methods such as baking, boiling, grilling, and broiling instead.  · Cook with " heart-healthy oils, such as olive, canola, soybean, or sunflower oil.  Meal planning     · Eat a balanced diet that includes:  ¨ 5 or more servings of fruits and vegetables each day. At each meal, try to fill half of your plate with fruits and vegetables.  ¨ Up to 6-8 servings of whole grains each day.  ¨ Less than 6 oz of lean meat, poultry, or fish each day. A 3-oz serving of meat is about the same size as a deck of cards. One egg equals 1 oz.  ¨ 2 servings of low-fat dairy each day.  ¨ A serving of nuts, seeds, or beans 5 times each week.  ¨ Heart-healthy fats. Healthy fats called Omega-3 fatty acids are found in foods such as flaxseeds and coldwater fish, like sardines, salmon, and mackerel.  · Limit how much you eat of the following:  ¨ Canned or prepackaged foods.  ¨ Food that is high in trans fat, such as fried foods.  ¨ Food that is high in saturated fat, such as fatty meat.  ¨ Sweets, desserts, sugary drinks, and other foods with added sugar.  ¨ Full-fat dairy products.  · Do not salt foods before eating.  · Try to eat at least 2 vegetarian meals each week.  · Eat more home-cooked food and less restaurant, buffet, and fast food.  · When eating at a restaurant, ask that your food be prepared with less salt or no salt, if possible.  What foods are recommended?  The items listed may not be a complete list. Talk with your dietitian about what dietary choices are best for you.  Grains   Whole-grain or whole-wheat bread. Whole-grain or whole-wheat pasta. Brown rice. Oatmeal. Quinoa. Bulgur. Whole-grain and low-sodium cereals. Zari bread. Low-fat, low-sodium crackers. Whole-wheat flour tortillas.  Vegetables   Fresh or frozen vegetables (raw, steamed, roasted, or grilled). Low-sodium or reduced-sodium tomato and vegetable juice. Low-sodium or reduced-sodium tomato sauce and tomato paste. Low-sodium or reduced-sodium canned vegetables.  Fruits   All fresh, dried, or frozen fruit. Canned fruit in natural juice  (without added sugar).  Meat and other protein foods   Skinless chicken or turkey. Ground chicken or turkey. Pork with fat trimmed off. Fish and seafood. Egg whites. Dried beans, peas, or lentils. Unsalted nuts, nut butters, and seeds. Unsalted canned beans. Lean cuts of beef with fat trimmed off. Low-sodium, lean deli meat.  Dairy   Low-fat (1%) or fat-free (skim) milk. Fat-free, low-fat, or reduced-fat cheeses. Nonfat, low-sodium ricotta or cottage cheese. Low-fat or nonfat yogurt. Low-fat, low-sodium cheese.  Fats and oils   Soft margarine without trans fats. Vegetable oil. Low-fat, reduced-fat, or light mayonnaise and salad dressings (reduced-sodium). Canola, safflower, olive, soybean, and sunflower oils. Avocado.  Seasoning and other foods   Herbs. Spices. Seasoning mixes without salt. Unsalted popcorn and pretzels. Fat-free sweets.  What foods are not recommended?  The items listed may not be a complete list. Talk with your dietitian about what dietary choices are best for you.  Grains   Baked goods made with fat, such as croissants, muffins, or some breads. Dry pasta or rice meal packs.  Vegetables   Creamed or fried vegetables. Vegetables in a cheese sauce. Regular canned vegetables (not low-sodium or reduced-sodium). Regular canned tomato sauce and paste (not low-sodium or reduced-sodium). Regular tomato and vegetable juice (not low-sodium or reduced-sodium). Pickles. Olives.  Fruits   Canned fruit in a light or heavy syrup. Fried fruit. Fruit in cream or butter sauce.  Meat and other protein foods   Fatty cuts of meat. Ribs. Fried meat. Chaves. Sausage. Bologna and other processed lunch meats. Salami. Fatback. Hotdogs. Bratwurst. Salted nuts and seeds. Canned beans with added salt. Canned or smoked fish. Whole eggs or egg yolks. Chicken or turkey with skin.  Dairy   Whole or 2% milk, cream, and half-and-half. Whole or full-fat cream cheese. Whole-fat or sweetened yogurt. Full-fat cheese. Nondairy creamers.  Whipped toppings. Processed cheese and cheese spreads.  Fats and oils   Butter. Stick margarine. Lard. Shortening. Ghee. Chaves fat. Tropical oils, such as coconut, palm kernel, or palm oil.  Seasoning and other foods   Salted popcorn and pretzels. Onion salt, garlic salt, seasoned salt, table salt, and sea salt. Worcestershire sauce. Tartar sauce. Barbecue sauce. Teriyaki sauce. Soy sauce, including reduced-sodium. Steak sauce. Canned and packaged gravies. Fish sauce. Oyster sauce. Cocktail sauce. Horseradish that you find on the shelf. Ketchup. Mustard. Meat flavorings and tenderizers. Bouillon cubes. Hot sauce and Tabasco sauce. Premade or packaged marinades. Premade or packaged taco seasonings. Relishes. Regular salad dressings.  Where to find more information:  · National Heart, Lung, and Blood [place]: www.nhlbi.nih.gov  · American Heart Association: www.heart.org  Summary  · The DASH eating plan is a healthy eating plan that has been shown to reduce high blood pressure (hypertension). It may also reduce your risk for type 2 diabetes, heart disease, and stroke.  · With the DASH eating plan, you should limit salt (sodium) intake to 2,300 mg a day. If you have hypertension, you may need to reduce your sodium intake to 1,500 mg a day.  · When on the DASH eating plan, aim to eat more fresh fruits and vegetables, whole grains, lean proteins, low-fat dairy, and heart-healthy fats.  · Work with your health care provider or diet and nutrition specialist (dietitian) to adjust your eating plan to your individual calorie needs.  This information is not intended to replace advice given to you by your health care provider. Make sure you discuss any questions you have with your health care provider.  Document Released: 12/06/2012 Document Revised: 12/11/2017 Document Reviewed: 12/11/2017  [employer] Interactive Patient Education © 2017 [employer]  BMI for Adults  Body mass index (BMI) is a number that is calculated from a  person's weight and height. In most adults, the number is used to find how much of an adult's weight is made up of fat. BMI is not as accurate as a direct measure of body fat.  How is BMI calculated?  BMI is calculated by dividing weight in kilograms by height in meters squared. It can also be calculated by dividing weight in pounds by height in inches squared, then multiplying the resulting number by 703. Charts are available to help you find your BMI quickly and easily without doing this calculation.  How is BMI interpreted?  Health care professionals use BMI charts to identify whether an adult is underweight, at a normal weight, or overweight based on the following guidelines:  · Underweight: BMI less than 18.5.  · Normal weight: BMI between 18.5 and 24.9.  · Overweight: BMI between 25 and 29.9.  · Obese: BMI of 30 and above.  BMI is usually interpreted the same for males and females.  Weight includes both fat and muscle, so someone with a muscular build, such as an athlete, may have a BMI that is higher than 24.9. In cases like these, BMI may not accurately depict body fat. To determine if excess body fat is the cause of a BMI of 25 or higher, further assessments may need to be done by a health care provider.  Why is BMI a useful tool?  BMI is used to identify a possible weight problem that may be related to a medical problem or may increase the risk for medical problems. BMI can also be used to promote changes to reach a healthy weight.  This information is not intended to replace advice given to you by your health care provider. Make sure you discuss any questions you have with your health care provider.  Document Released: 08/29/2005 Document Revised: 04/27/2017 Document Reviewed: 05/15/2015  [employer] Interactive Patient Education © 2017 [employer]  Calorie Counting for Weight Loss  Calories are units of energy. Your body needs a certain amount of calories from food to keep you going throughout the day.  When you eat more calories than your body needs, your body stores the extra calories as fat. When you eat fewer calories than your body needs, your body burns fat to get the energy it needs.  Calorie counting means keeping track of how many calories you eat and drink each day. Calorie counting can be helpful if you need to lose weight. If you make sure to eat fewer calories than your body needs, you should lose weight. Ask your health care provider what a healthy weight is for you.  For calorie counting to work, you will need to eat the right number of calories in a day in order to lose a healthy amount of weight per week. A dietitian can help you determine how many calories you need in a day and will give you suggestions on how to reach your calorie goal.  · A healthy amount of weight to lose per week is usually 1-2 lb (0.5-0.9 kg). This usually means that your daily calorie intake should be reduced by 500-750 calories.  · Eating 1,200 - 1,500 calories per day can help most women lose weight.  · Eating 1,500 - 1,800 calories per day can help most men lose weight.  What is my plan?  My goal is to have __________ calories per day.  If I have this many calories per day, I should lose around __________ pounds per week.  What do I need to know about calorie counting?  In order to meet your daily calorie goal, you will need to:  · Find out how many calories are in each food you would like to eat. Try to do this before you eat.  · Decide how much of the food you plan to eat.  · Write down what you ate and how many calories it had. Doing this is called keeping a food log.  To successfully lose weight, it is important to balance calorie counting with a healthy lifestyle that includes regular activity. Aim for 150 minutes of moderate exercise (such as walking) or 75 minutes of vigorous exercise (such as running) each week.  Where do I find calorie information?     The number of calories in a food can be found on a Nutrition  Facts label. If a food does not have a Nutrition Facts label, try to look up the calories online or ask your dietitian for help.  Remember that calories are listed per serving. If you choose to have more than one serving of a food, you will have to multiply the calories per serving by the amount of servings you plan to eat. For example, the label on a package of bread might say that a serving size is 1 slice and that there are 90 calories in a serving. If you eat 1 slice, you will have eaten 90 calories. If you eat 2 slices, you will have eaten 180 calories.  How do I keep a food log?  Immediately after each meal, record the following information in your food log:  · What you ate. Don't forget to include toppings, sauces, and other extras on the food.  · How much you ate. This can be measured in cups, ounces, or number of items.  · How many calories each food and drink had.  · The total number of calories in the meal.  Keep your food log near you, such as in a small notebook in your pocket, or use a mobile apoorva or website. Some programs will calculate calories for you and show you how many calories you have left for the day to meet your goal.  What are some calorie counting tips?  · Use your calories on foods and drinks that will fill you up and not leave you hungry:  ¨ Some examples of foods that fill you up are nuts and nut butters, vegetables, lean proteins, and high-fiber foods like whole grains. High-fiber foods are foods with more than 5 g fiber per serving.  ¨ Drinks such as sodas, specialty coffee drinks, alcohol, and juices have a lot of calories, yet do not fill you up.  · Eat nutritious foods and avoid empty calories. Empty calories are calories you get from foods or beverages that do not have many vitamins or protein, such as candy, sweets, and soda. It is better to have a nutritious high-calorie food (such as an avocado) than a food with few nutrients (such as a bag of chips).  · Know how many calories  "are in the foods you eat most often. This will help you calculate calorie counts faster.  · Pay attention to calories in drinks. Low-calorie drinks include water and unsweetened drinks.  · Pay attention to nutrition labels for \"low fat\" or \"fat free\" foods. These foods sometimes have the same amount of calories or more calories than the full fat versions. They also often have added sugar, starch, or salt, to make up for flavor that was removed with the fat.  · Find a way of tracking calories that works for you. Get creative. Try different apps or programs if writing down calories does not work for you.  What are some portion control tips?  · Know how many calories are in a serving. This will help you know how many servings of a certain food you can have.  · Use a measuring cup to measure serving sizes. You could also try weighing out portions on a kitchen scale. With time, you will be able to estimate serving sizes for some foods.  · Take some time to put servings of different foods on your favorite plates, bowls, and cups so you know what a serving looks like.  · Try not to eat straight from a bag or box. Doing this can lead to overeating. Put the amount you would like to eat in a cup or on a plate to make sure you are eating the right portion.  · Use smaller plates, glasses, and bowls to prevent overeating.  · Try not to multitask (for example, watch TV or use your computer) while eating. If it is time to eat, sit down at a table and enjoy your food. This will help you to know when you are full. It will also help you to be aware of what you are eating and how much you are eating.  What are tips for following this plan?  Reading food labels   · Check the calorie count compared to the serving size. The serving size may be smaller than what you are used to eating.  · Check the source of the calories. Make sure the food you are eating is high in vitamins and protein and low in saturated and trans fats.  Shopping "   · Read nutrition labels while you shop. This will help you make healthy decisions before you decide to purchase your food.  · Make a grocery list and stick to it.  Cooking   · Try to cook your favorite foods in a healthier way. For example, try baking instead of frying.  · Use low-fat dairy products.  Meal planning   · Use more fruits and vegetables. Half of your plate should be fruits and vegetables.  · Include lean proteins like poultry and fish.  How do I count calories when eating out?  · Ask for smaller portion sizes.  · Consider sharing an entree and sides instead of getting your own entree.  · If you get your own entree, eat only half. Ask for a box at the beginning of your meal and put the rest of your entree in it so you are not tempted to eat it.  · If calories are listed on the menu, choose the lower calorie options.  · Choose dishes that include vegetables, fruits, whole grains, low-fat dairy products, and lean protein.  · Choose items that are boiled, broiled, grilled, or steamed. Stay away from items that are buttered, battered, fried, or served with cream sauce. Items labeled “crispy” are usually fried, unless stated otherwise.  · Choose water, low-fat milk, unsweetened iced tea, or other drinks without added sugar. If you want an alcoholic beverage, choose a lower calorie option such as a glass of wine or light beer.  · Ask for dressings, sauces, and syrups on the side. These are usually high in calories, so you should limit the amount you eat.  · If you want a salad, choose a garden salad and ask for grilled meats. Avoid extra toppings like buchanan, cheese, or fried items. Ask for the dressing on the side, or ask for olive oil and vinegar or lemon to use as dressing.  · Estimate how many servings of a food you are given. For example, a serving of cooked rice is ½ cup or about the size of half a baseball. Knowing serving sizes will help you be aware of how much food you are eating at restaurants. The  list below tells you how big or small some common portion sizes are based on everyday objects:  ¨ 1 oz--4 stacked dice.  ¨ 3 oz--1 deck of cards.  ¨ 1 tsp--1 die.  ¨ 1 Tbsp--½ a ping-pong ball.  ¨ 2 Tbsp--1 ping-pong ball.  ¨ ½ cup--½ baseball.  ¨ 1 cup--1 baseball.  Summary  · Calorie counting means keeping track of how many calories you eat and drink each day. If you eat fewer calories than your body needs, you should lose weight.  · A healthy amount of weight to lose per week is usually 1-2 lb (0.5-0.9 kg). This usually means reducing your daily calorie intake by 500-750 calories.  · The number of calories in a food can be found on a Nutrition Facts label. If a food does not have a Nutrition Facts label, try to look up the calories online or ask your dietitian for help.  · Use your calories on foods and drinks that will fill you up, and not on foods and drinks that will leave you hungry.  · Use smaller plates, glasses, and bowls to prevent overeating.  This information is not intended to replace advice given to you by your health care provider. Make sure you discuss any questions you have with your health care provider.  Document Released: 12/18/2006 Document Revised: 11/17/2017 Document Reviewed: 11/17/2017  [employer] Interactive Patient Education © 2017 [employer]

## 2018-06-14 ENCOUNTER — TELEPHONE (OUTPATIENT)
Dept: CARDIOLOGY | Facility: CLINIC | Age: 63
End: 2018-06-14

## 2018-06-14 NOTE — TELEPHONE ENCOUNTER
Agree to change prescription to 20 mg 3 to 5 tablets as needed. Most likely quanity of 30 and can give refill. Thanks.

## 2018-06-14 NOTE — TELEPHONE ENCOUNTER
Qamar with [facility] called and asked if sildenafil could be changed from 100 mg as needed for ED to sildenafil 20 mg 3 to 5 tablets as needed for ED could be sent because it would be cheaper for patient.       [phone]

## 2018-06-15 RX ORDER — ASPIRIN 81 MG/1
TABLET ORAL
Qty: 90 TABLET | Refills: 1 | Status: SHIPPED | OUTPATIENT
Start: 2018-06-15 | End: 2019-12-17 | Stop reason: SDUPTHER

## 2018-06-18 ENCOUNTER — LAB (OUTPATIENT)
Dept: LAB | Facility: HOSPITAL | Age: 63
End: 2018-06-18

## 2018-06-18 DIAGNOSIS — Z12.5 SCREENING PSA (PROSTATE SPECIFIC ANTIGEN): ICD-10-CM

## 2018-06-18 DIAGNOSIS — Z79.899 MEDICATION MANAGEMENT: ICD-10-CM

## 2018-06-18 DIAGNOSIS — I10 ESSENTIAL HYPERTENSION: ICD-10-CM

## 2018-06-18 DIAGNOSIS — R35.1 NOCTURIA: ICD-10-CM

## 2018-06-18 DIAGNOSIS — E78.2 MIXED HYPERLIPIDEMIA: ICD-10-CM

## 2018-06-18 DIAGNOSIS — E78.00 PURE HYPERCHOLESTEROLEMIA: ICD-10-CM

## 2018-06-18 LAB
ALBUMIN SERPL-MCNC: 5 G/DL (ref 3.4–4.8)
ALBUMIN/GLOB SERPL: 2 G/DL (ref 1.5–2.5)
ALP SERPL-CCNC: 106 U/L (ref 40–129)
ALT SERPL W P-5'-P-CCNC: 25 U/L (ref 10–44)
ANION GAP SERPL CALCULATED.3IONS-SCNC: 9.1 MMOL/L (ref 3.6–11.2)
AST SERPL-CCNC: 25 U/L (ref 10–34)
BILIRUB SERPL-MCNC: 0.5 MG/DL (ref 0.2–1.8)
BUN BLD-MCNC: 11 MG/DL (ref 7–21)
BUN/CREAT SERPL: 10.7 (ref 7–25)
CALCIUM SPEC-SCNC: 9.5 MG/DL (ref 7.7–10)
CHLORIDE SERPL-SCNC: 107 MMOL/L (ref 99–112)
CHOLEST SERPL-MCNC: 150 MG/DL (ref 0–200)
CO2 SERPL-SCNC: 23.9 MMOL/L (ref 24.3–31.9)
CREAT BLD-MCNC: 1.03 MG/DL (ref 0.43–1.29)
DEPRECATED RDW RBC AUTO: 41.5 FL (ref 37–54)
ERYTHROCYTE [DISTWIDTH] IN BLOOD BY AUTOMATED COUNT: 12.2 % (ref 11.5–14.5)
GFR SERPL CREATININE-BSD FRML MDRD: 73 ML/MIN/1.73
GLOBULIN UR ELPH-MCNC: 2.5 GM/DL
GLUCOSE BLD-MCNC: 105 MG/DL (ref 70–110)
HCT VFR BLD AUTO: 41.9 % (ref 42–52)
HDLC SERPL-MCNC: 35 MG/DL (ref 60–100)
HGB BLD-MCNC: 14.2 G/DL (ref 14–18)
LDLC SERPL CALC-MCNC: 99 MG/DL (ref 0–100)
LDLC/HDLC SERPL: 2.84 {RATIO}
MCH RBC QN AUTO: 32.1 PG (ref 27–33)
MCHC RBC AUTO-ENTMCNC: 33.9 G/DL (ref 33–37)
MCV RBC AUTO: 94.8 FL (ref 80–94)
OSMOLALITY SERPL CALC.SUM OF ELEC: 279.2 MOSM/KG (ref 273–305)
PLATELET # BLD AUTO: 210 10*3/MM3 (ref 130–400)
PMV BLD AUTO: 10.9 FL (ref 6–10)
POTASSIUM BLD-SCNC: 4.5 MMOL/L (ref 3.5–5.3)
PROT SERPL-MCNC: 7.5 G/DL (ref 6–8)
PSA SERPL-MCNC: 0.45 NG/ML (ref 0–4)
RBC # BLD AUTO: 4.42 10*6/MM3 (ref 4.7–6.1)
SODIUM BLD-SCNC: 140 MMOL/L (ref 135–153)
TRIGL SERPL-MCNC: 78 MG/DL (ref 0–150)
VLDLC SERPL-MCNC: 15.6 MG/DL
WBC NRBC COR # BLD: 3.69 10*3/MM3 (ref 4.5–12.5)

## 2018-06-18 PROCEDURE — G0103 PSA SCREENING: HCPCS | Performed by: NURSE PRACTITIONER

## 2018-06-18 PROCEDURE — 80061 LIPID PANEL: CPT | Performed by: NURSE PRACTITIONER

## 2018-06-18 PROCEDURE — 80053 COMPREHEN METABOLIC PANEL: CPT | Performed by: NURSE PRACTITIONER

## 2018-06-18 PROCEDURE — 36415 COLL VENOUS BLD VENIPUNCTURE: CPT

## 2018-06-18 PROCEDURE — 85027 COMPLETE CBC AUTOMATED: CPT | Performed by: NURSE PRACTITIONER

## 2018-08-15 RX ORDER — ASPIRIN 325 MG
TABLET ORAL
Qty: 60 TABLET | Refills: 8 | Status: SHIPPED | OUTPATIENT
Start: 2018-08-15 | End: 2018-12-13 | Stop reason: SDUPTHER

## 2018-12-13 ENCOUNTER — OFFICE VISIT (OUTPATIENT)
Dept: CARDIOLOGY | Facility: CLINIC | Age: 63
End: 2018-12-13

## 2018-12-13 VITALS
HEIGHT: 73 IN | HEART RATE: 76 BPM | WEIGHT: 248 LBS | BODY MASS INDEX: 32.87 KG/M2 | SYSTOLIC BLOOD PRESSURE: 120 MMHG | DIASTOLIC BLOOD PRESSURE: 84 MMHG

## 2018-12-13 DIAGNOSIS — R00.2 PALPITATION: ICD-10-CM

## 2018-12-13 DIAGNOSIS — E78.00 PURE HYPERCHOLESTEROLEMIA: ICD-10-CM

## 2018-12-13 DIAGNOSIS — E66.09 CLASS 1 OBESITY DUE TO EXCESS CALORIES WITHOUT SERIOUS COMORBIDITY WITH BODY MASS INDEX (BMI) OF 33.0 TO 33.9 IN ADULT: ICD-10-CM

## 2018-12-13 DIAGNOSIS — I34.0 NON-RHEUMATIC MITRAL REGURGITATION: ICD-10-CM

## 2018-12-13 DIAGNOSIS — I25.10 CORONARY ARTERY DISEASE INVOLVING NATIVE CORONARY ARTERY OF NATIVE HEART WITHOUT ANGINA PECTORIS: Primary | ICD-10-CM

## 2018-12-13 DIAGNOSIS — I10 ESSENTIAL HYPERTENSION: ICD-10-CM

## 2018-12-13 PROCEDURE — 99214 OFFICE O/P EST MOD 30 MIN: CPT | Performed by: NURSE PRACTITIONER

## 2018-12-13 RX ORDER — GEMFIBROZIL 600 MG/1
600 TABLET, FILM COATED ORAL
Qty: 90 TABLET | Refills: 3 | Status: SHIPPED | OUTPATIENT
Start: 2018-12-13 | End: 2019-12-17 | Stop reason: SDUPTHER

## 2018-12-13 RX ORDER — OMEPRAZOLE 20 MG/1
20 CAPSULE, DELAYED RELEASE ORAL DAILY
Qty: 90 CAPSULE | Refills: 3 | Status: SHIPPED | OUTPATIENT
Start: 2018-12-13 | End: 2019-12-02 | Stop reason: SDUPTHER

## 2018-12-13 RX ORDER — SILDENAFIL CITRATE 20 MG/1
20 TABLET ORAL 3 TIMES DAILY
Qty: 90 TABLET | Refills: 0 | Status: SHIPPED | OUTPATIENT
Start: 2018-12-13 | End: 2019-12-17 | Stop reason: SDUPTHER

## 2018-12-13 RX ORDER — LISINOPRIL 5 MG/1
5 TABLET ORAL DAILY
Qty: 90 TABLET | Refills: 3 | Status: SHIPPED | OUTPATIENT
Start: 2018-12-13 | End: 2019-12-17 | Stop reason: SDUPTHER

## 2018-12-13 RX ORDER — VILAZODONE HYDROCHLORIDE 40 MG/1
40 TABLET ORAL DAILY
Qty: 90 TABLET | Refills: 3 | Status: SHIPPED | OUTPATIENT
Start: 2018-12-13 | End: 2019-12-17 | Stop reason: SDUPTHER

## 2018-12-13 RX ORDER — PRAVASTATIN SODIUM 40 MG
40 TABLET ORAL DAILY
Qty: 90 TABLET | Refills: 3 | Status: SHIPPED | OUTPATIENT
Start: 2018-12-13 | End: 2019-12-17 | Stop reason: SDUPTHER

## 2018-12-13 RX ORDER — CARVEDILOL 12.5 MG/1
12.5 TABLET ORAL 2 TIMES DAILY WITH MEALS
Qty: 180 TABLET | Refills: 3 | Status: SHIPPED | OUTPATIENT
Start: 2018-12-13 | End: 2019-10-27 | Stop reason: SDUPTHER

## 2018-12-13 RX ORDER — SILDENAFIL CITRATE 20 MG/1
TABLET ORAL
COMMUNITY
End: 2018-12-13 | Stop reason: SDUPTHER

## 2018-12-13 NOTE — PROGRESS NOTES
Chief Complaint   Patient presents with   • Follow-up     For cardiac management. Patient is on aspirin. Last lab work was done on 06/18/18 per Yani in chart under labs.   • Med Refill     Needs refills on cardiac medications. 90 day supplies to [facility]. Sildenafil needs to be sent to [facility].       Cardiac Complaints  none      Subjective   Adair Chowdhury is a 63 y.o. male with HTN, hyperlipidemia, and palpitations for which he wore a Holter monitor in 2001. PVCs were noted and he has been managed with beta blocker. Cardiac workup in 2014 showed moderate disease of the LAD which has been managed medically.  Repeat cardiac workup discussed last visit due to length of time since last testing but he declined as he was asymptomatic.  He returns today for follow up and is still doing well. Cardiac symptoms are denied and he stays busy at home doing ADLS without concerns.  Labs were done last in June, repeat order requested today.  Refills of cardiac meds requested for 90 day supply.        Cardiac History  Past Surgical History:   Procedure Laterality Date   • CARDIOVASCULAR STRESS TEST  10/13/2005    Stress- 8 min 96% THR. /100 Small Ischemia in Lateral Wall   • CARDIOVASCULAR STRESS TEST  10/14/2009    Stress- 8 min, 97% THR. Negative, Increase Coreg to 12.5 mg BID   • CARDIOVASCULAR STRESS TEST  02/24/2014    Stress- 7 min, 93% THR, 172/90. R/O Inferior Ischemia, Lisinopril added   • CATH LAB PROCEDURE  11/10/2005    Cath- Normal including Renal   • CATH LAB PROCEDURE  03/10/2014    Cath- EF 60%, 30% LAD.   • CONVERTED (HISTORICAL) HOLTER  05/30/2001    Holter-Frequent PVC's   • ECHO - CONVERTED  08/29/2006    Echo- Normal (Dr. Resendez)   • ECHO - CONVERTED  10/14/2009    Echo-EF >60%, aortic root- 4.2cm   • ECHO - CONVERTED  02/24/2014    Echo-EF 60-65%   • HERNIA REPAIR     • TONSILLECTOMY         Current Outpatient Medications   Medication Sig Dispense Refill   • ASPIR-LOW 81 MG  EC tablet TAKE TWO TABLETS BY MOUTH ONCE DAILY 90 tablet 1   • carvedilol (COREG) 12.5 MG tablet Take 1 tablet by mouth 2 (Two) Times a Day With Meals. 180 tablet 3   • gemfibrozil (LOPID) 600 MG tablet Take 1 tablet by mouth 2 (Two) Times a Day Before Meals. 90 tablet 3   • lisinopril (PRINIVIL,ZESTRIL) 5 MG tablet Take 1 tablet by mouth Daily. 90 tablet 3   • mometasone (NASONEX) 50 MCG/ACT nasal spray 2 sprays into each nostril daily.     • omeprazole (priLOSEC) 20 MG capsule Take 1 capsule by mouth Daily. 90 capsule 3   • pravastatin (PRAVACHOL) 40 MG tablet Take 1 tablet by mouth Daily. 90 tablet 3   • sildenafil (REVATIO) 20 MG tablet Take 1 tablet by mouth 3 (Three) Times a Day. 3 to 5 tablets as needed 90 tablet 0   • vilazodone (VIIBRYD) 40 MG tablet tablet Take 1 tablet by mouth Daily. 90 tablet 3     No current facility-administered medications for this visit.        Patient has no known allergies.    Past Medical History:   Diagnosis Date   • Abnormal pulse oximetry 03/10/2011    Overnight pulse ox- Negative   • H pylori ulcer    • Heart murmur    • Heart murmur    • History of hernia surgery    • History of tonsillectomy    • Hyperlipidemia    • Hypertension    • LV dysfunction     mild       Social History     Socioeconomic History   • Marital status:      Spouse name: Not on file   • Number of children: Not on file   • Years of education: Not on file   • Highest education level: Not on file   Social Needs   • Financial resource strain: Not on file   • Food insecurity - worry: Not on file   • Food insecurity - inability: Not on file   • Transportation needs - medical: Not on file   • Transportation needs - non-medical: Not on file   Occupational History   • Not on file   Tobacco Use   • Smoking status: Never Smoker   • Smokeless tobacco: Never Used   Substance and Sexual Activity   • Alcohol use: No   • Drug use: No   • Sexual activity: Not on file   Other Topics Concern   • Not on file   Social  "History Narrative   • Not on file       Family History   Problem Relation Age of Onset   • Heart disease Mother    • Hypertension Mother    • Hyperlipidemia Mother    • Heart disease Father    • Heart attack Father    • Hyperlipidemia Father    • Hypertension Father    • Heart failure Father    • Diabetes Other    • Hypertension Other    • Hyperlipidemia Other    • No Known Problems Child        Review of Systems   Constitution: Negative for weakness and malaise/fatigue.   Cardiovascular: Negative for chest pain, claudication, dyspnea on exertion, irregular heartbeat, near-syncope, orthopnea, palpitations and syncope.   Respiratory: Negative for cough, shortness of breath and wheezing.    Musculoskeletal: Negative for back pain, joint pain and joint swelling.   Gastrointestinal: Negative for anorexia, heartburn, nausea and vomiting.   Genitourinary: Negative for dysuria, hematuria, hesitancy and nocturia.   Neurological: Negative for dizziness, light-headedness and loss of balance.   Psychiatric/Behavioral: Negative for depression and memory loss. The patient is not nervous/anxious.            Objective     /84 (BP Location: Left arm)   Pulse 76   Ht 185.4 cm (72.99\")   Wt 112 kg (248 lb)   BMI 32.73 kg/m²     Physical Exam   Constitutional: He is oriented to person, place, and time. He appears well-developed and well-nourished.   HENT:   Head: Normocephalic and atraumatic.   Eyes: EOM are normal. Pupils are equal, round, and reactive to light.   Neck: Normal range of motion. Neck supple.   Cardiovascular: Normal rate and regular rhythm.   Murmur heard.  Pulmonary/Chest: Effort normal and breath sounds normal.   Abdominal: Soft.   Musculoskeletal: Normal range of motion.   Neurological: He is alert and oriented to person, place, and time.   Skin: Skin is warm and dry.   Psychiatric: He has a normal mood and affect. His behavior is normal.       Procedures    Assessment/Plan     HR remains stable today as " well as blood pressure.  HTN well managed on current, no adjustment recommended. For history of Palpitations/PVC, he remains on coreg therapy and tolerates well, palpitations denied.  For history of IHD, he remains on ASA daily and tolerates well, signs of bleeding and bruising are denied.  Most recent labs from June show lipids at goal with lopid and pravachol therapy, repeat labs to include FLP ordered for patient.  For ED, revatio refills will be sent to [facility] per request.  Repeat cardiac workup was discussed since length of time since last testing and history of IHD patient declines as he has remained asymptomatic and does not have financial means to pay for it.  BMI elevated at 32.73, good cardiac diet and activity as tolerated advised.  6 month follow up scheduled or sooner if needed.        Problems Addressed this Visit        Cardiovascular and Mediastinum    Hyperlipemia    Relevant Medications    gemfibrozil (LOPID) 600 MG tablet    pravastatin (PRAVACHOL) 40 MG tablet    Other Relevant Orders    CBC (No Diff)    Comprehensive Metabolic Panel    Lipid Panel    TSH    HTN (hypertension)    Relevant Medications    carvedilol (COREG) 12.5 MG tablet    lisinopril (PRINIVIL,ZESTRIL) 5 MG tablet    Other Relevant Orders    CBC (No Diff)    Comprehensive Metabolic Panel    Lipid Panel    TSH    Palpitation    Mitral regurgitation    Relevant Medications    sildenafil (REVATIO) 20 MG tablet    carvedilol (COREG) 12.5 MG tablet    Coronary artery disease involving native coronary artery of native heart without angina pectoris - Primary    Relevant Medications    sildenafil (REVATIO) 20 MG tablet    carvedilol (COREG) 12.5 MG tablet    Other Relevant Orders    CBC (No Diff)    Comprehensive Metabolic Panel    Lipid Panel    TSH       Digestive    Class 1 obesity due to excess calories without serious comorbidity with body mass index (BMI) of 33.0 to 33.9 in adult          Patient's Body mass index is 32.73  kg/m². BMI is above normal parameters. Recommendations include: nutrition counseling.        Electronically signed by GLADYS Akers December 13, 2018 4:16 PM

## 2018-12-20 ENCOUNTER — LAB (OUTPATIENT)
Dept: LAB | Facility: HOSPITAL | Age: 63
End: 2018-12-20

## 2018-12-20 DIAGNOSIS — E78.00 PURE HYPERCHOLESTEROLEMIA: ICD-10-CM

## 2018-12-20 DIAGNOSIS — I10 ESSENTIAL HYPERTENSION: ICD-10-CM

## 2018-12-20 DIAGNOSIS — I25.10 CORONARY ARTERY DISEASE INVOLVING NATIVE CORONARY ARTERY OF NATIVE HEART WITHOUT ANGINA PECTORIS: ICD-10-CM

## 2018-12-20 LAB
ALBUMIN SERPL-MCNC: 5 G/DL (ref 3.4–4.8)
ALBUMIN/GLOB SERPL: 2 G/DL (ref 1.5–2.5)
ALP SERPL-CCNC: 104 U/L (ref 40–129)
ALT SERPL W P-5'-P-CCNC: 26 U/L (ref 10–44)
ANION GAP SERPL CALCULATED.3IONS-SCNC: 9.9 MMOL/L (ref 3.6–11.2)
AST SERPL-CCNC: 21 U/L (ref 10–34)
BILIRUB SERPL-MCNC: 0.5 MG/DL (ref 0.2–1.8)
BUN BLD-MCNC: 10 MG/DL (ref 7–21)
BUN/CREAT SERPL: 9.3 (ref 7–25)
CALCIUM SPEC-SCNC: 9.7 MG/DL (ref 7.7–10)
CHLORIDE SERPL-SCNC: 104 MMOL/L (ref 99–112)
CHOLEST SERPL-MCNC: 152 MG/DL (ref 0–200)
CO2 SERPL-SCNC: 25.1 MMOL/L (ref 24.3–31.9)
CREAT BLD-MCNC: 1.08 MG/DL (ref 0.43–1.29)
DEPRECATED RDW RBC AUTO: 41.5 FL (ref 37–54)
ERYTHROCYTE [DISTWIDTH] IN BLOOD BY AUTOMATED COUNT: 12.4 % (ref 11.5–14.5)
GFR SERPL CREATININE-BSD FRML MDRD: 69 ML/MIN/1.73
GLOBULIN UR ELPH-MCNC: 2.5 GM/DL
GLUCOSE BLD-MCNC: 102 MG/DL (ref 70–110)
HCT VFR BLD AUTO: 44.5 % (ref 42–52)
HDLC SERPL-MCNC: 37 MG/DL (ref 60–100)
HGB BLD-MCNC: 14.9 G/DL (ref 14–18)
LDLC SERPL CALC-MCNC: 93 MG/DL (ref 0–100)
LDLC/HDLC SERPL: 2.51 {RATIO}
MCH RBC QN AUTO: 31.4 PG (ref 27–33)
MCHC RBC AUTO-ENTMCNC: 33.5 G/DL (ref 33–37)
MCV RBC AUTO: 93.9 FL (ref 80–94)
OSMOLALITY SERPL CALC.SUM OF ELEC: 276.8 MOSM/KG (ref 273–305)
PLATELET # BLD AUTO: 209 10*3/MM3 (ref 130–400)
PMV BLD AUTO: 11.2 FL (ref 6–10)
POTASSIUM BLD-SCNC: 4.7 MMOL/L (ref 3.5–5.3)
PROT SERPL-MCNC: 7.5 G/DL (ref 6–8)
RBC # BLD AUTO: 4.74 10*6/MM3 (ref 4.7–6.1)
SODIUM BLD-SCNC: 139 MMOL/L (ref 135–153)
TRIGL SERPL-MCNC: 110 MG/DL (ref 0–150)
TSH SERPL DL<=0.05 MIU/L-ACNC: 0.9 MIU/ML (ref 0.55–4.78)
VLDLC SERPL-MCNC: 22 MG/DL
WBC NRBC COR # BLD: 4.5 10*3/MM3 (ref 4.5–12.5)

## 2018-12-20 PROCEDURE — 80061 LIPID PANEL: CPT | Performed by: NURSE PRACTITIONER

## 2018-12-20 PROCEDURE — 84443 ASSAY THYROID STIM HORMONE: CPT | Performed by: NURSE PRACTITIONER

## 2018-12-20 PROCEDURE — 36415 COLL VENOUS BLD VENIPUNCTURE: CPT

## 2018-12-20 PROCEDURE — 80053 COMPREHEN METABOLIC PANEL: CPT | Performed by: NURSE PRACTITIONER

## 2018-12-20 PROCEDURE — 85027 COMPLETE CBC AUTOMATED: CPT | Performed by: NURSE PRACTITIONER

## 2019-06-17 ENCOUNTER — OFFICE VISIT (OUTPATIENT)
Dept: CARDIOLOGY | Facility: CLINIC | Age: 64
End: 2019-06-17

## 2019-06-17 VITALS
WEIGHT: 249 LBS | HEART RATE: 72 BPM | HEIGHT: 73 IN | DIASTOLIC BLOOD PRESSURE: 80 MMHG | SYSTOLIC BLOOD PRESSURE: 140 MMHG | BODY MASS INDEX: 33 KG/M2

## 2019-06-17 DIAGNOSIS — I10 ESSENTIAL HYPERTENSION: Primary | ICD-10-CM

## 2019-06-17 DIAGNOSIS — I25.10 CORONARY ARTERY DISEASE INVOLVING NATIVE CORONARY ARTERY OF NATIVE HEART WITHOUT ANGINA PECTORIS: ICD-10-CM

## 2019-06-17 DIAGNOSIS — I34.0 NON-RHEUMATIC MITRAL REGURGITATION: ICD-10-CM

## 2019-06-17 DIAGNOSIS — E78.00 PURE HYPERCHOLESTEROLEMIA: ICD-10-CM

## 2019-06-17 PROCEDURE — 99213 OFFICE O/P EST LOW 20 MIN: CPT | Performed by: NURSE PRACTITIONER

## 2019-06-17 NOTE — PROGRESS NOTES
Chief Complaint   Patient presents with   • Follow-up     Cardiac management. Last labs in chart. He reports B/P stable at home. Patient does not need refills at this time.   • Dizziness     Has some in the morning when he gets up, he feels related to allergies.       Subjective       Adair Chowdhury is a 64 y.o. male  with HTN, hyperlipidemia, and palpitations for which he wore a Holter monitor in 2001. PVCs were noted and he has been managed with beta blocker. Cardiac workup in 2014 showed moderate disease of the LAD which has been managed medically.  Repeat cardiac workup has been discussed due to length of time since last testing but he declined as he was asymptomatic.     Today he comes to the office for a follow-up visit.  He denies chest pain, palpitations, or shortness of breath.  He admits to dizziness when he first gets up in the morning which he attributes to sinus congestion.  During the day he denies dizziness or lightheadedness.  He is raising a 5-year-old granddaughter and stays quite active without symptoms.  No recent medication changes are noted.    HPI     Cardiac History:    Past Surgical History:   Procedure Laterality Date   • CARDIOVASCULAR STRESS TEST  10/13/2005    Stress- 8 min 96% THR. /100 Small Ischemia in Lateral Wall   • CARDIOVASCULAR STRESS TEST  10/14/2009    Stress- 8 min, 97% THR. Negative, Increase Coreg to 12.5 mg BID   • CARDIOVASCULAR STRESS TEST  02/24/2014    Stress- 7 min, 93% THR, 172/90. R/O Inferior Ischemia, Lisinopril added   • CATH LAB PROCEDURE  11/10/2005    Cath- Normal including Renal   • CATH LAB PROCEDURE  03/10/2014    Cath- EF 60%, 30% LAD.   • CONVERTED (HISTORICAL) HOLTER  05/30/2001    Holter-Frequent PVC's   • ECHO - CONVERTED  08/29/2006    Echo- Normal (Dr. Resendez)   • ECHO - CONVERTED  10/14/2009    Echo-EF >60%, aortic root- 4.2cm   • ECHO - CONVERTED  02/24/2014    Echo-EF 60-65%   • HERNIA REPAIR     • TONSILLECTOMY         Current Outpatient  Medications   Medication Sig Dispense Refill   • ASPIR-LOW 81 MG EC tablet TAKE TWO TABLETS BY MOUTH ONCE DAILY 90 tablet 1   • carvedilol (COREG) 12.5 MG tablet Take 1 tablet by mouth 2 (Two) Times a Day With Meals. 180 tablet 3   • gemfibrozil (LOPID) 600 MG tablet Take 1 tablet by mouth 2 (Two) Times a Day Before Meals. 90 tablet 3   • lisinopril (PRINIVIL,ZESTRIL) 5 MG tablet Take 1 tablet by mouth Daily. 90 tablet 3   • mometasone (NASONEX) 50 MCG/ACT nasal spray 2 sprays into each nostril daily.     • omeprazole (priLOSEC) 20 MG capsule Take 1 capsule by mouth Daily. 90 capsule 3   • pravastatin (PRAVACHOL) 40 MG tablet Take 1 tablet by mouth Daily. 90 tablet 3   • sildenafil (REVATIO) 20 MG tablet Take 1 tablet by mouth 3 (Three) Times a Day. 3 to 5 tablets as needed 90 tablet 0   • vilazodone (VIIBRYD) 40 MG tablet tablet Take 1 tablet by mouth Daily. 90 tablet 3     No current facility-administered medications for this visit.        Patient has no known allergies.    Past Medical History:   Diagnosis Date   • Abnormal pulse oximetry 03/10/2011    Overnight pulse ox- Negative   • H pylori ulcer    • Heart murmur    • Heart murmur    • History of hernia surgery    • History of tonsillectomy    • Hyperlipidemia    • Hypertension    • LV dysfunction     mild       Social History     Socioeconomic History   • Marital status:      Spouse name: Not on file   • Number of children: Not on file   • Years of education: Not on file   • Highest education level: Not on file   Tobacco Use   • Smoking status: Never Smoker   • Smokeless tobacco: Never Used   Substance and Sexual Activity   • Alcohol use: No   • Drug use: No       Family History   Problem Relation Age of Onset   • Heart disease Mother    • Hypertension Mother    • Hyperlipidemia Mother    • Heart disease Father    • Heart attack Father    • Hyperlipidemia Father    • Hypertension Father    • Heart failure Father    • Diabetes Other    • Hypertension  "Other    • Hyperlipidemia Other    • No Known Problems Child        Review of Systems   Constitution: Negative for decreased appetite, diaphoresis, fever and weakness.   HENT: Negative for congestion (mild nasal and sinus), ear pain and nosebleeds.    Eyes: Negative for discharge, redness and visual disturbance.   Cardiovascular: Negative for chest pain, claudication, dyspnea on exertion, irregular heartbeat, leg swelling, near-syncope, palpitations and paroxysmal nocturnal dyspnea.   Respiratory: Negative for cough, shortness of breath and sleep disturbances due to breathing.    Endocrine: Negative for polydipsia, polyphagia and polyuria.   Hematologic/Lymphatic: Negative for bleeding problem. Does not bruise/bleed easily (bruises easily but not excessive).   Skin: Negative for color change, dry skin and itching.   Musculoskeletal: Positive for arthritis and joint pain. Negative for falls and myalgias.   Gastrointestinal: Negative for change in bowel habit, heartburn, melena and nausea.   Genitourinary: Negative for dysuria and hematuria.   Neurological: Positive for dizziness. Negative for headaches, light-headedness, loss of balance and numbness.   Allergic/Immunologic: Positive for environmental allergies. Negative for hives.        Objective     /80 (BP Location: Right arm)   Pulse 72   Ht 185.4 cm (72.99\")   Wt 113 kg (249 lb)   BMI 32.86 kg/m²     Physical Exam   Constitutional: He is oriented to person, place, and time. He appears well-nourished.   HENT:   Head: Normocephalic.   Eyes: Conjunctivae are normal. Pupils are equal, round, and reactive to light.   Neck: Normal range of motion. Neck supple. Carotid bruit is not present.   Cardiovascular: Normal rate, regular rhythm, S1 normal and S2 normal.   Murmur heard.   Systolic murmur is present with a grade of 2/6.  Pulses:       Radial pulses are 2+ on the right side, and 2+ on the left side.   Pulmonary/Chest: Effort normal and breath sounds " normal.   Abdominal: Soft. Bowel sounds are normal.   Musculoskeletal: Normal range of motion. He exhibits no edema.   Neurological: He is alert and oriented to person, place, and time.   Skin: Skin is warm and dry.   Psychiatric: He has a normal mood and affect. His behavior is normal.        Procedures: none today        Assessment/Plan      Adair was seen today for follow-up and dizziness.    Diagnoses and all orders for this visit:    Essential hypertension    Pure hypercholesterolemia    Non-rheumatic mitral regurgitation    Coronary artery disease involving native coronary artery of native heart without angina pectoris    Mr. Chowdhury presents to the office today without cardiac complaints or concerns voiced.  He admits to maintaining normal activity without issues.  His blood pressure, heart rate, and heart rhythm are normal today.  The same dose of Coreg and Zestril recommended.  Cardiac murmur appears soft.  Given he remains asymptomatic he does not want to proceed with repeat cardiac work-up although it has been 5 years since his cardiac cath.  Should he develop any problems he does agree to call and repeat stress/echo will be advised.    December labs reviewed and lipids at goal.  Liver function test normal.  Same dose Pravachol and Lopid advised. We will plan to recheck labs next visit.     Patient's Body mass index is 32.86 kg/m². BMI is above normal parameters. Recommendations include: nutrition counseling.  Diet for weight loss encouraged.  He admits to maintaining good activity.  I encouraged him on routine exercise such as daily walks.    A 6-month follow-up visit scheduled.  Please call sooner for any cardiac concerns.            Electronically signed by GLADYS Alvarez,  June 17, 2019 11:25 AM

## 2019-10-28 RX ORDER — CARVEDILOL 12.5 MG/1
TABLET ORAL
Qty: 180 TABLET | Refills: 3 | Status: SHIPPED | OUTPATIENT
Start: 2019-10-28 | End: 2019-12-17 | Stop reason: SDUPTHER

## 2019-12-02 RX ORDER — OMEPRAZOLE 20 MG/1
CAPSULE, DELAYED RELEASE ORAL
Qty: 30 CAPSULE | Refills: 0 | Status: SHIPPED | OUTPATIENT
Start: 2019-12-02 | End: 2019-12-17 | Stop reason: SDUPTHER

## 2019-12-17 ENCOUNTER — OFFICE VISIT (OUTPATIENT)
Dept: CARDIOLOGY | Facility: CLINIC | Age: 64
End: 2019-12-17

## 2019-12-17 VITALS
HEART RATE: 68 BPM | HEIGHT: 73 IN | WEIGHT: 240 LBS | BODY MASS INDEX: 31.81 KG/M2 | SYSTOLIC BLOOD PRESSURE: 132 MMHG | DIASTOLIC BLOOD PRESSURE: 80 MMHG

## 2019-12-17 DIAGNOSIS — Z79.899 LONG-TERM USE OF HIGH-RISK MEDICATION: ICD-10-CM

## 2019-12-17 DIAGNOSIS — I25.10 CORONARY ARTERY DISEASE INVOLVING NATIVE CORONARY ARTERY OF NATIVE HEART WITHOUT ANGINA PECTORIS: ICD-10-CM

## 2019-12-17 DIAGNOSIS — I10 ESSENTIAL HYPERTENSION: ICD-10-CM

## 2019-12-17 DIAGNOSIS — J06.9 ACUTE UPPER RESPIRATORY INFECTION: ICD-10-CM

## 2019-12-17 DIAGNOSIS — R00.2 PALPITATIONS: Primary | ICD-10-CM

## 2019-12-17 DIAGNOSIS — E78.5 HYPERLIPIDEMIA LDL GOAL <100: ICD-10-CM

## 2019-12-17 DIAGNOSIS — F41.9 ANXIETY: ICD-10-CM

## 2019-12-17 DIAGNOSIS — E66.9 OBESITY (BMI 30.0-34.9): ICD-10-CM

## 2019-12-17 PROCEDURE — 99213 OFFICE O/P EST LOW 20 MIN: CPT | Performed by: NURSE PRACTITIONER

## 2019-12-17 RX ORDER — VILAZODONE HYDROCHLORIDE 40 MG/1
40 TABLET ORAL DAILY
Qty: 90 TABLET | Refills: 3 | Status: SHIPPED | OUTPATIENT
Start: 2019-12-17 | End: 2020-06-30 | Stop reason: SDUPTHER

## 2019-12-17 RX ORDER — ASPIRIN 81 MG/1
162 TABLET ORAL DAILY
Qty: 180 TABLET | Refills: 2 | Status: SHIPPED | OUTPATIENT
Start: 2019-12-17 | End: 2020-06-30 | Stop reason: SDUPTHER

## 2019-12-17 RX ORDER — SILDENAFIL CITRATE 20 MG/1
20 TABLET ORAL 3 TIMES DAILY
Qty: 90 TABLET | Refills: 0 | Status: SHIPPED | OUTPATIENT
Start: 2019-12-17 | End: 2020-06-30 | Stop reason: SDUPTHER

## 2019-12-17 RX ORDER — GEMFIBROZIL 600 MG/1
600 TABLET, FILM COATED ORAL
Qty: 90 TABLET | Refills: 3 | Status: SHIPPED | OUTPATIENT
Start: 2019-12-17 | End: 2020-06-30 | Stop reason: SDUPTHER

## 2019-12-17 RX ORDER — OMEPRAZOLE 20 MG/1
20 CAPSULE, DELAYED RELEASE ORAL DAILY
Qty: 90 CAPSULE | Refills: 2 | Status: SHIPPED | OUTPATIENT
Start: 2019-12-17 | End: 2020-06-30 | Stop reason: SDUPTHER

## 2019-12-17 RX ORDER — CARVEDILOL 12.5 MG/1
12.5 TABLET ORAL 2 TIMES DAILY WITH MEALS
Qty: 180 TABLET | Refills: 3 | Status: SHIPPED | OUTPATIENT
Start: 2019-12-17 | End: 2020-06-30 | Stop reason: SDUPTHER

## 2019-12-17 RX ORDER — PRAVASTATIN SODIUM 40 MG
40 TABLET ORAL DAILY
Qty: 90 TABLET | Refills: 3 | Status: SHIPPED | OUTPATIENT
Start: 2019-12-17 | End: 2019-12-23 | Stop reason: ALTCHOICE

## 2019-12-17 RX ORDER — LISINOPRIL 5 MG/1
5 TABLET ORAL DAILY
Qty: 90 TABLET | Refills: 3 | Status: SHIPPED | OUTPATIENT
Start: 2019-12-17 | End: 2020-06-30 | Stop reason: SDUPTHER

## 2019-12-17 RX ORDER — PREDNISONE 20 MG/1
20 TABLET ORAL DAILY
Qty: 5 TABLET | Refills: 0 | Status: SHIPPED | OUTPATIENT
Start: 2019-12-17 | End: 2020-06-30 | Stop reason: ALTCHOICE

## 2019-12-17 RX ORDER — AMOXICILLIN AND CLAVULANATE POTASSIUM 875; 125 MG/1; MG/1
1 TABLET, FILM COATED ORAL 2 TIMES DAILY
Qty: 20 TABLET | Refills: 0 | Status: SHIPPED | OUTPATIENT
Start: 2019-12-17 | End: 2020-06-30 | Stop reason: ALTCHOICE

## 2019-12-17 NOTE — PROGRESS NOTES
Chief Complaint   Patient presents with   • Follow-up     For cardiac management. Patient is on aspirin. Last lab work was done 12/20/18 per you, in chart under labs. Reports that he has been having congestion and starts wheezing at night, has had for about 3 weeks.    • Med Refill     Needs refills on cardiac medications. 90 day supplies to [facility] in [place].        Cardiac Complaints  none      Subjective   Adair Chowdhury is a 64 y.o. male with HTN, hyperlipidemia, and palpitations (in the form of PVC) for which he wore a Holter monitor in 2001. PVCs were noted and he has been managed with beta blocker. Cardiac workup in 2014 showed moderate disease of the LAD which has been managed medically.  Repeat cardiac workup has been discussed due to length of time since last testing but he declined as he has remained asymptomatic.     He returns today for follow up and and denies any cardiac concerns. Chest pain, dizziness, palpitations, and syncope.  He does repot some issues with head congestion and wheezing at night he has been battling for about 3 weeks.  He has tried OTC products with no help. He has not had labs since last December. Refills are requested for 90 day supply.          Cardiac History  Past Surgical History:   Procedure Laterality Date   • CARDIOVASCULAR STRESS TEST  10/13/2005    Stress- 8 min 96% THR. /100 Small Ischemia in Lateral Wall   • CARDIOVASCULAR STRESS TEST  10/14/2009    Stress- 8 min, 97% THR. Negative, Increase Coreg to 12.5 mg BID   • CARDIOVASCULAR STRESS TEST  02/24/2014    Stress- 7 min, 93% THR, 172/90. R/O Inferior Ischemia, Lisinopril added   • CATH LAB PROCEDURE  11/10/2005    Cath- Normal including Renal   • CATH LAB PROCEDURE  03/10/2014    Cath- EF 60%, 30% LAD.   • CONVERTED (HISTORICAL) HOLTER  05/30/2001    Holter-Frequent PVC's   • ECHO - CONVERTED  08/29/2006    Echo- Normal (Dr. Resendez)   • ECHO - CONVERTED  10/14/2009    Echo-EF >60%, aortic root- 4.2cm    • ECHO - CONVERTED  02/24/2014    Echo-EF 60-65%   • HERNIA REPAIR     • TONSILLECTOMY         Current Outpatient Medications   Medication Sig Dispense Refill   • aspirin (ASPIR-LOW) 81 MG EC tablet Take 2 tablets by mouth Daily. 180 tablet 2   • carvedilol (COREG) 12.5 MG tablet Take 1 tablet by mouth 2 (Two) Times a Day With Meals. 180 tablet 3   • gemfibrozil (LOPID) 600 MG tablet Take 1 tablet by mouth 2 (Two) Times a Day Before Meals. 90 tablet 3   • lisinopril (PRINIVIL,ZESTRIL) 5 MG tablet Take 1 tablet by mouth Daily. 90 tablet 3   • mometasone (NASONEX) 50 MCG/ACT nasal spray 2 sprays into each nostril daily.     • omeprazole (priLOSEC) 20 MG capsule Take 1 capsule by mouth Daily. 90 capsule 2   • pravastatin (PRAVACHOL) 40 MG tablet Take 1 tablet by mouth Daily. 90 tablet 3   • sildenafil (REVATIO) 20 MG tablet Take 1 tablet by mouth 3 (Three) Times a Day. 3 to 5 tablets as needed 90 tablet 0   • vilazodone (VIIBRYD) 40 MG tablet tablet Take 1 tablet by mouth Daily. 90 tablet 3   • amoxicillin-clavulanate (AUGMENTIN) 875-125 MG per tablet Take 1 tablet by mouth 2 (Two) Times a Day. 20 tablet 0   • predniSONE (DELTASONE) 20 MG tablet Take 1 tablet by mouth Daily. 5 tablet 0     No current facility-administered medications for this visit.        Patient has no known allergies.    Past Medical History:   Diagnosis Date   • Abnormal pulse oximetry 03/10/2011    Overnight pulse ox- Negative   • H pylori ulcer    • Heart murmur    • Heart murmur    • History of hernia surgery    • History of tonsillectomy    • Hyperlipidemia    • Hypertension    • LV dysfunction     mild       Social History     Socioeconomic History   • Marital status:      Spouse name: Not on file   • Number of children: Not on file   • Years of education: Not on file   • Highest education level: Not on file   Tobacco Use   • Smoking status: Never Smoker   • Smokeless tobacco: Never Used   Substance and Sexual Activity   • Alcohol  "use: No   • Drug use: No       Family History   Problem Relation Age of Onset   • Heart disease Mother    • Hypertension Mother    • Hyperlipidemia Mother    • Heart disease Father    • Heart attack Father    • Hyperlipidemia Father    • Hypertension Father    • Heart failure Father    • Diabetes Other    • Hypertension Other    • Hyperlipidemia Other    • No Known Problems Child        Review of Systems   Constitution: Negative for malaise/fatigue and night sweats.   HENT: Positive for congestion and sore throat.    Cardiovascular: Negative for chest pain, claudication, dyspnea on exertion, irregular heartbeat, leg swelling, near-syncope, palpitations and syncope.   Respiratory: Negative for cough, shortness of breath and wheezing.    Musculoskeletal: Negative for back pain, joint pain and joint swelling.   Gastrointestinal: Negative for anorexia, heartburn, nausea and vomiting.   Genitourinary: Negative for dysuria, hematuria, hesitancy and nocturia.   Neurological: Negative for dizziness, light-headedness and loss of balance.   Psychiatric/Behavioral: Negative for depression and memory loss. The patient is not nervous/anxious.            Objective     /80 (BP Location: Left arm)   Pulse 68   Ht 185.4 cm (72.99\")   Wt 109 kg (240 lb)   BMI 31.67 kg/m²     Physical Exam   Constitutional: He is oriented to person, place, and time. He appears well-developed and well-nourished.   HENT:   Head: Normocephalic and atraumatic.   Eyes: Pupils are equal, round, and reactive to light. EOM are normal.   Neck: Normal range of motion. Neck supple.   Cardiovascular: Normal rate and regular rhythm.   Murmur heard.  Pulmonary/Chest: Effort normal and breath sounds normal.   Abdominal: Soft.   Musculoskeletal: Normal range of motion.   Neurological: He is alert and oriented to person, place, and time.   Skin: Skin is warm and dry.   Psychiatric: He has a normal mood and affect. His behavior is normal. "       Procedures    Assessment/Plan     HTN:  Well managed on current. No adjustment to current lisinopril therapy recommended. Limited sodium intake advised.    Palpitations/PVC:  Well managed on current. Same coreg dosing to be continued. Limited caffeine intake encouraged.    Cardiac status:  Stable. Patient continues to decline further workup at present as he remains asymptomatic.  ASA therapy continued as bleeding and bruising denied.     Hyperlipidemia:  Managed with pravachol therapy.  No adjustment to current dosing advised at present. Lab order for repeat FLP provided with more recommendations to follow.    Sinus congestion/URI:  Augmentin therapy advised twice daily for 10 days. Patient will also be urged to add prednisone 20mg daily for 5 days. If no improvement, patient urged to see PCP.    Anxiety:  Viibryd continued. Anxiety denied.    Refills of cardiac meds sent per request.    6 month follow up scheduled or sooner if needed.          Problems Addressed this Visit        Cardiovascular and Mediastinum    HTN (hypertension)    Relevant Medications    lisinopril (PRINIVIL,ZESTRIL) 5 MG tablet    carvedilol (COREG) 12.5 MG tablet    Other Relevant Orders    CBC (No Diff)    Comprehensive Metabolic Panel    TSH    Coronary artery disease involving native coronary artery of native heart without angina pectoris    Relevant Medications    sildenafil (REVATIO) 20 MG tablet    carvedilol (COREG) 12.5 MG tablet      Other Visit Diagnoses     Palpitations    -  Primary    Relevant Orders    CBC (No Diff)    Comprehensive Metabolic Panel    TSH    Hyperlipidemia LDL goal <100        Relevant Medications    pravastatin (PRAVACHOL) 40 MG tablet    gemfibrozil (LOPID) 600 MG tablet    Other Relevant Orders    Lipid Panel    Long-term use of high-risk medication        Relevant Orders    CBC (No Diff)    Comprehensive Metabolic Panel    TSH    Acute upper respiratory infection        Relevant Medications     amoxicillin-clavulanate (AUGMENTIN) 875-125 MG per tablet    Obesity (BMI 30.0-34.9)        Anxiety              Patient's Body mass index is 31.67 kg/m². BMI is above normal parameters. Recommendations include: nutrition counseling.                Electronically signed by GLADYS Akers December 17, 2019 5:54 PM

## 2019-12-18 ENCOUNTER — LAB (OUTPATIENT)
Dept: LAB | Facility: HOSPITAL | Age: 64
End: 2019-12-18

## 2019-12-18 DIAGNOSIS — E78.5 HYPERLIPIDEMIA LDL GOAL <100: ICD-10-CM

## 2019-12-18 DIAGNOSIS — Z79.899 LONG-TERM USE OF HIGH-RISK MEDICATION: ICD-10-CM

## 2019-12-18 DIAGNOSIS — I10 ESSENTIAL HYPERTENSION: ICD-10-CM

## 2019-12-18 DIAGNOSIS — R00.2 PALPITATIONS: ICD-10-CM

## 2019-12-18 LAB
ALBUMIN SERPL-MCNC: 5.07 G/DL (ref 3.5–5.2)
ALBUMIN/GLOB SERPL: 1.9 G/DL
ALP SERPL-CCNC: 107 U/L (ref 39–117)
ALT SERPL W P-5'-P-CCNC: 20 U/L (ref 1–41)
ANION GAP SERPL CALCULATED.3IONS-SCNC: 14.8 MMOL/L (ref 5–15)
AST SERPL-CCNC: 21 U/L (ref 1–40)
BILIRUB SERPL-MCNC: 0.4 MG/DL (ref 0.2–1.2)
BUN BLD-MCNC: 14 MG/DL (ref 8–23)
BUN/CREAT SERPL: 11.5 (ref 7–25)
CALCIUM SPEC-SCNC: 9.6 MG/DL (ref 8.6–10.5)
CHLORIDE SERPL-SCNC: 100 MMOL/L (ref 98–107)
CHOLEST SERPL-MCNC: 173 MG/DL (ref 0–200)
CO2 SERPL-SCNC: 23.2 MMOL/L (ref 22–29)
CREAT BLD-MCNC: 1.22 MG/DL (ref 0.76–1.27)
DEPRECATED RDW RBC AUTO: 42.7 FL (ref 37–54)
ERYTHROCYTE [DISTWIDTH] IN BLOOD BY AUTOMATED COUNT: 12.8 % (ref 12.3–15.4)
GFR SERPL CREATININE-BSD FRML MDRD: 60 ML/MIN/1.73
GLOBULIN UR ELPH-MCNC: 2.7 GM/DL
GLUCOSE BLD-MCNC: 109 MG/DL (ref 65–99)
HCT VFR BLD AUTO: 42.4 % (ref 37.5–51)
HDLC SERPL-MCNC: 43 MG/DL (ref 40–60)
HGB BLD-MCNC: 15 G/DL (ref 13–17.7)
LDLC SERPL CALC-MCNC: 114 MG/DL (ref 0–100)
LDLC/HDLC SERPL: 2.64 {RATIO}
MCH RBC QN AUTO: 34.4 PG (ref 26.6–33)
MCHC RBC AUTO-ENTMCNC: 35.4 G/DL (ref 31.5–35.7)
MCV RBC AUTO: 97.2 FL (ref 79–97)
PLATELET # BLD AUTO: 213 10*3/MM3 (ref 140–450)
PMV BLD AUTO: 11.4 FL (ref 6–12)
POTASSIUM BLD-SCNC: 4.4 MMOL/L (ref 3.5–5.2)
PROT SERPL-MCNC: 7.8 G/DL (ref 6–8.5)
RBC # BLD AUTO: 4.36 10*6/MM3 (ref 4.14–5.8)
SODIUM BLD-SCNC: 138 MMOL/L (ref 136–145)
TRIGL SERPL-MCNC: 82 MG/DL (ref 0–150)
TSH SERPL DL<=0.05 MIU/L-ACNC: 1.52 UIU/ML (ref 0.27–4.2)
VLDLC SERPL-MCNC: 16.4 MG/DL
WBC NRBC COR # BLD: 4.3 10*3/MM3 (ref 3.4–10.8)

## 2019-12-18 PROCEDURE — 85027 COMPLETE CBC AUTOMATED: CPT | Performed by: NURSE PRACTITIONER

## 2019-12-18 PROCEDURE — 84443 ASSAY THYROID STIM HORMONE: CPT | Performed by: NURSE PRACTITIONER

## 2019-12-18 PROCEDURE — 80061 LIPID PANEL: CPT | Performed by: NURSE PRACTITIONER

## 2019-12-18 PROCEDURE — 80053 COMPREHEN METABOLIC PANEL: CPT | Performed by: NURSE PRACTITIONER

## 2019-12-18 PROCEDURE — 36415 COLL VENOUS BLD VENIPUNCTURE: CPT

## 2019-12-23 ENCOUNTER — TELEPHONE (OUTPATIENT)
Dept: CARDIOLOGY | Facility: CLINIC | Age: 64
End: 2019-12-23

## 2019-12-23 RX ORDER — ATORVASTATIN CALCIUM 10 MG/1
10 TABLET, FILM COATED ORAL NIGHTLY
Qty: 90 TABLET | Refills: 3 | Status: SHIPPED | OUTPATIENT
Start: 2019-12-23 | End: 2020-06-30 | Stop reason: SDUPTHER

## 2019-12-23 NOTE — TELEPHONE ENCOUNTER
Patient was made aware to discontinue pravastatin and to add lipitor 10 mg QHS. Script sent to [facility] with 90 tablets and 3 refills.

## 2019-12-23 NOTE — TELEPHONE ENCOUNTER
----- Message from GLADYS Feldman sent at 12/20/2019 11:51 AM EST -----  Lets try lipitor 10mg QHS.

## 2020-01-20 ENCOUNTER — PRIOR AUTHORIZATION (OUTPATIENT)
Dept: CARDIOLOGY | Facility: CLINIC | Age: 65
End: 2020-01-20

## 2020-03-18 ENCOUNTER — PRIOR AUTHORIZATION (OUTPATIENT)
Dept: CARDIOLOGY | Facility: CLINIC | Age: 65
End: 2020-03-18

## 2020-06-30 ENCOUNTER — OFFICE VISIT (OUTPATIENT)
Dept: CARDIOLOGY | Facility: CLINIC | Age: 65
End: 2020-06-30

## 2020-06-30 ENCOUNTER — LAB (OUTPATIENT)
Dept: LAB | Facility: HOSPITAL | Age: 65
End: 2020-06-30

## 2020-06-30 ENCOUNTER — TELEPHONE (OUTPATIENT)
Dept: CARDIOLOGY | Facility: CLINIC | Age: 65
End: 2020-06-30

## 2020-06-30 VITALS
HEART RATE: 74 BPM | BODY MASS INDEX: 31.99 KG/M2 | WEIGHT: 241.4 LBS | HEIGHT: 73 IN | SYSTOLIC BLOOD PRESSURE: 136 MMHG | DIASTOLIC BLOOD PRESSURE: 70 MMHG | TEMPERATURE: 98.5 F

## 2020-06-30 DIAGNOSIS — I10 ESSENTIAL HYPERTENSION: ICD-10-CM

## 2020-06-30 DIAGNOSIS — E78.00 PURE HYPERCHOLESTEROLEMIA: ICD-10-CM

## 2020-06-30 DIAGNOSIS — K21.9 GERD WITHOUT ESOPHAGITIS: ICD-10-CM

## 2020-06-30 DIAGNOSIS — R00.2 PALPITATION: ICD-10-CM

## 2020-06-30 DIAGNOSIS — I34.0 NONRHEUMATIC MITRAL VALVE REGURGITATION: ICD-10-CM

## 2020-06-30 DIAGNOSIS — I25.10 CORONARY ARTERY DISEASE INVOLVING NATIVE CORONARY ARTERY OF NATIVE HEART WITHOUT ANGINA PECTORIS: Primary | ICD-10-CM

## 2020-06-30 DIAGNOSIS — I25.10 CORONARY ARTERY DISEASE INVOLVING NATIVE CORONARY ARTERY OF NATIVE HEART WITHOUT ANGINA PECTORIS: ICD-10-CM

## 2020-06-30 DIAGNOSIS — N52.8 OTHER MALE ERECTILE DYSFUNCTION: ICD-10-CM

## 2020-06-30 PROBLEM — R60.0 EDEMA LEG: Status: ACTIVE | Noted: 2020-06-30

## 2020-06-30 PROBLEM — R60.0 EDEMA LEG: Status: RESOLVED | Noted: 2020-06-30 | Resolved: 2020-06-30

## 2020-06-30 LAB
ALBUMIN SERPL-MCNC: 4.76 G/DL (ref 3.5–5.2)
ALBUMIN/GLOB SERPL: 1.9 G/DL
ALP SERPL-CCNC: 101 U/L (ref 39–117)
ALT SERPL W P-5'-P-CCNC: 24 U/L (ref 1–41)
ANION GAP SERPL CALCULATED.3IONS-SCNC: 13.9 MMOL/L (ref 5–15)
AST SERPL-CCNC: 23 U/L (ref 1–40)
BILIRUB SERPL-MCNC: 0.5 MG/DL (ref 0.2–1.2)
BUN SERPL-MCNC: 10 MG/DL (ref 8–23)
BUN/CREAT SERPL: 9.5 (ref 7–25)
CALCIUM SPEC-SCNC: 9.6 MG/DL (ref 8.6–10.5)
CHLORIDE SERPL-SCNC: 99 MMOL/L (ref 98–107)
CHOLEST SERPL-MCNC: 146 MG/DL (ref 0–200)
CO2 SERPL-SCNC: 22.1 MMOL/L (ref 22–29)
CREAT SERPL-MCNC: 1.05 MG/DL (ref 0.76–1.27)
DEPRECATED RDW RBC AUTO: 46.5 FL (ref 37–54)
ERYTHROCYTE [DISTWIDTH] IN BLOOD BY AUTOMATED COUNT: 13 % (ref 12.3–15.4)
GFR SERPL CREATININE-BSD FRML MDRD: 71 ML/MIN/1.73
GLOBULIN UR ELPH-MCNC: 2.5 GM/DL
GLUCOSE SERPL-MCNC: 109 MG/DL (ref 65–99)
HCT VFR BLD AUTO: 43.9 % (ref 37.5–51)
HDLC SERPL-MCNC: 42 MG/DL (ref 40–60)
HGB BLD-MCNC: 14.1 G/DL (ref 13–17.7)
LDLC SERPL CALC-MCNC: 90 MG/DL (ref 0–100)
LDLC/HDLC SERPL: 2.13 {RATIO}
MCH RBC QN AUTO: 31.1 PG (ref 26.6–33)
MCHC RBC AUTO-ENTMCNC: 32.1 G/DL (ref 31.5–35.7)
MCV RBC AUTO: 96.9 FL (ref 79–97)
PLATELET # BLD AUTO: 225 10*3/MM3 (ref 140–450)
PMV BLD AUTO: 11.4 FL (ref 6–12)
POTASSIUM SERPL-SCNC: 4.7 MMOL/L (ref 3.5–5.2)
PROT SERPL-MCNC: 7.3 G/DL (ref 6–8.5)
RBC # BLD AUTO: 4.53 10*6/MM3 (ref 4.14–5.8)
SODIUM SERPL-SCNC: 135 MMOL/L (ref 136–145)
TRIGL SERPL-MCNC: 72 MG/DL (ref 0–150)
TSH SERPL DL<=0.05 MIU/L-ACNC: 1.05 UIU/ML (ref 0.27–4.2)
VLDLC SERPL-MCNC: 14.4 MG/DL
WBC # BLD AUTO: 4.09 10*3/MM3 (ref 3.4–10.8)

## 2020-06-30 PROCEDURE — 85027 COMPLETE CBC AUTOMATED: CPT | Performed by: NURSE PRACTITIONER

## 2020-06-30 PROCEDURE — 80061 LIPID PANEL: CPT | Performed by: NURSE PRACTITIONER

## 2020-06-30 PROCEDURE — 80053 COMPREHEN METABOLIC PANEL: CPT | Performed by: NURSE PRACTITIONER

## 2020-06-30 PROCEDURE — 36415 COLL VENOUS BLD VENIPUNCTURE: CPT

## 2020-06-30 PROCEDURE — 99214 OFFICE O/P EST MOD 30 MIN: CPT | Performed by: NURSE PRACTITIONER

## 2020-06-30 PROCEDURE — 84443 ASSAY THYROID STIM HORMONE: CPT | Performed by: NURSE PRACTITIONER

## 2020-06-30 RX ORDER — SILDENAFIL CITRATE 20 MG/1
20 TABLET ORAL 3 TIMES DAILY
Qty: 90 TABLET | Refills: 0 | Status: SHIPPED | OUTPATIENT
Start: 2020-06-30 | End: 2020-07-01 | Stop reason: SDUPTHER

## 2020-06-30 RX ORDER — LISINOPRIL 5 MG/1
5 TABLET ORAL DAILY
Qty: 90 TABLET | Refills: 3 | Status: SHIPPED | OUTPATIENT
Start: 2020-06-30 | End: 2021-07-12 | Stop reason: SDUPTHER

## 2020-06-30 RX ORDER — VILAZODONE HYDROCHLORIDE 40 MG/1
40 TABLET ORAL DAILY
Qty: 90 TABLET | Refills: 3 | Status: SHIPPED | OUTPATIENT
Start: 2020-06-30 | End: 2021-07-12

## 2020-06-30 RX ORDER — CARVEDILOL 12.5 MG/1
12.5 TABLET ORAL 2 TIMES DAILY WITH MEALS
Qty: 180 TABLET | Refills: 3 | Status: SHIPPED | OUTPATIENT
Start: 2020-06-30 | End: 2021-07-12 | Stop reason: SDUPTHER

## 2020-06-30 RX ORDER — ASPIRIN 81 MG/1
162 TABLET ORAL DAILY
Qty: 180 TABLET | Refills: 3 | Status: SHIPPED | OUTPATIENT
Start: 2020-06-30 | End: 2021-07-12 | Stop reason: SDUPTHER

## 2020-06-30 RX ORDER — GEMFIBROZIL 600 MG/1
600 TABLET, FILM COATED ORAL
Qty: 90 TABLET | Refills: 3 | Status: SHIPPED | OUTPATIENT
Start: 2020-06-30 | End: 2021-01-12

## 2020-06-30 RX ORDER — OMEPRAZOLE 20 MG/1
20 CAPSULE, DELAYED RELEASE ORAL DAILY
Qty: 90 CAPSULE | Refills: 3 | Status: SHIPPED | OUTPATIENT
Start: 2020-06-30 | End: 2021-07-12 | Stop reason: SDUPTHER

## 2020-06-30 RX ORDER — ATORVASTATIN CALCIUM 10 MG/1
10 TABLET, FILM COATED ORAL NIGHTLY
Qty: 90 TABLET | Refills: 3 | Status: SHIPPED | OUTPATIENT
Start: 2020-06-30 | End: 2021-01-08 | Stop reason: DRUGHIGH

## 2020-06-30 NOTE — PROGRESS NOTES
Chief Complaint   Patient presents with   • Follow-up     cardiac management . Had labs done December 2019, results on chart. Had decrease in Statin from 20 mg to 10 mg Has no cardiac complaints today   • Med Refill     Needs refills on all cardiac and cholesterol meds 90 day  to [facility] pharmacy       Subjective       Adair Chowdhury is a 65 y.o. male  with HTN, hyperlipidemia, and palpitations (in the form of PVC) for which he wore a Holter monitor in 2001. PVCs were noted and he has been managed with beta blocker. Cardiac workup in 2014 showed moderate disease of the LAD which has been managed medically.  Repeat cardiac workup has been discussed due to length of time since last testing but he declined as he has remained asymptomatic and his co-pay is extremely high.    He came today for regular follow-up.  He has done well recently.  Denies cardiac symptoms.  No chest pain, shortness of breath, dizziness or syncope.  Palpitations are very infrequent and not bothersome. Lipids checked 12/2019 showed LDL increased from 93 to 114.  TSH 1.55, CMP normal other than glucose 109, CBC stable. Pravastatin was changed to Lipitor.  He has tolerated well.  Labs have not been rechecked since then.  He remains active, walks quite a bit without difficulty.  He continues to prefer conservative management, declines repeat cardiac work-up at this time.    HPI         Cardiac History:    Past Surgical History:   Procedure Laterality Date   • CARDIAC CATHETERIZATION  03/10/2014    Cath- EF 60%, 30% LAD.   • CARDIAC CATHETERIZATION  11/10/2005    Cath- Normal including Renal   • CARDIOVASCULAR STRESS TEST  10/13/2005    Stress- 8 min 96% THR. /100 Small Ischemia in Lateral Wall   • CARDIOVASCULAR STRESS TEST  10/14/2009    Stress- 8 min, 97% THR. Negative, Increase Coreg to 12.5 mg BID   • CARDIOVASCULAR STRESS TEST  02/24/2014    Stress- 7 min, 93% THR, 172/90. R/O Inferior Ischemia, Lisinopril added   • CONVERTED (HISTORICAL)  HOLTER  05/30/2001    Holter-Frequent PVC's   • ECHO - CONVERTED  08/29/2006    Echo- Normal (Dr. Resendez)   • ECHO - CONVERTED  10/14/2009    Echo-EF >60%, aortic root- 4.2cm   • ECHO - CONVERTED  02/24/2014    Echo-EF 60-65%       Current Outpatient Medications   Medication Sig Dispense Refill   • aspirin (aspirin) 81 MG EC tablet Take 2 tablets by mouth Daily. 180 tablet 3   • atorvastatin (LIPITOR) 10 MG tablet Take 1 tablet by mouth Every Night. 90 tablet 3   • carvedilol (COREG) 12.5 MG tablet Take 1 tablet by mouth 2 (Two) Times a Day With Meals. 180 tablet 3   • gemfibrozil (LOPID) 600 MG tablet Take 1 tablet by mouth 2 (Two) Times a Day Before Meals. 90 tablet 3   • lisinopril (PRINIVIL,ZESTRIL) 5 MG tablet Take 1 tablet by mouth Daily. 90 tablet 3   • omeprazole (priLOSEC) 20 MG capsule Take 1 capsule by mouth Daily. 90 capsule 3   • sildenafil (REVATIO) 20 MG tablet Take 1 tablet by mouth 3 (Three) Times a Day. 3 to 5 tablets as needed 90 tablet 0   • vilazodone (VIIBRYD) 40 MG tablet tablet Take 1 tablet by mouth Daily. 90 tablet 3     No current facility-administered medications for this visit.        Patient has no known allergies.    Past Medical History:   Diagnosis Date   • Abnormal pulse oximetry 03/10/2011    Overnight pulse ox- Negative   • H pylori ulcer    • Heart murmur    • Heart murmur    • History of hernia surgery    • History of tonsillectomy    • Hyperlipidemia    • Hypertension    • LV dysfunction     mild       Social History     Socioeconomic History   • Marital status:      Spouse name: Not on file   • Number of children: Not on file   • Years of education: Not on file   • Highest education level: Not on file   Tobacco Use   • Smoking status: Never Smoker   • Smokeless tobacco: Never Used   Substance and Sexual Activity   • Alcohol use: No   • Drug use: No       Family History   Problem Relation Age of Onset   • Heart disease Mother    • Hypertension Mother    • Hyperlipidemia  "Mother    • Heart disease Father    • Heart attack Father    • Hyperlipidemia Father    • Hypertension Father    • Heart failure Father    • Diabetes Other    • Hypertension Other    • Hyperlipidemia Other    • No Known Problems Child        Review of Systems   Constitution: Negative for decreased appetite, malaise/fatigue and weight loss.   HENT: Negative.    Cardiovascular: Positive for palpitations (Infrequent). Negative for chest pain, dyspnea on exertion, leg swelling and syncope.   Respiratory: Negative for cough and shortness of breath.    Endocrine: Negative.    Hematologic/Lymphatic: Negative.    Skin: Negative.    Musculoskeletal: Negative for myalgias.   Gastrointestinal: Negative for abdominal pain and melena.   Genitourinary: Negative for hematuria.   Neurological: Negative for dizziness.   Psychiatric/Behavioral: Negative for altered mental status and depression.   Allergic/Immunologic: Negative.       Diabetes- No  Thyroid-normal    Objective     /70 (BP Location: Right arm)   Pulse 74   Temp 98.5 °F (36.9 °C)   Ht 185.4 cm (72.99\")   Wt 109 kg (241 lb 6.4 oz)   BMI 31.86 kg/m²     Physical Exam   Constitutional: He is oriented to person, place, and time. He appears well-developed and well-nourished. No distress.   HENT:   Head: Normocephalic.   Eyes: Pupils are equal, round, and reactive to light.   Neck: Normal range of motion.   Cardiovascular: Normal rate, regular rhythm and intact distal pulses.   No murmur heard.  Pulmonary/Chest: Effort normal and breath sounds normal. No respiratory distress. He has no wheezes. He has no rales.   Abdominal: Soft. Bowel sounds are normal.   Musculoskeletal: Normal range of motion. He exhibits no edema.   Neurological: He is alert and oriented to person, place, and time.   Skin: Skin is warm and dry. He is not diaphoretic.   Psychiatric: He has a normal mood and affect.   Nursing note and vitals reviewed.     Procedures          Assessment/Plan  "     Adair was seen today for follow-up and med refill.    Diagnoses and all orders for this visit:    Coronary artery disease involving native coronary artery of native heart without angina pectoris  -     CBC (No Diff); Future  -     Comprehensive Metabolic Panel; Future  -     aspirin (aspirin) 81 MG EC tablet; Take 2 tablets by mouth Daily.    Essential hypertension  -     CBC (No Diff); Future  -     Comprehensive Metabolic Panel; Future  -     lisinopril (PRINIVIL,ZESTRIL) 5 MG tablet; Take 1 tablet by mouth Daily.  -     carvedilol (COREG) 12.5 MG tablet; Take 1 tablet by mouth 2 (Two) Times a Day With Meals.    Pure hypercholesterolemia  -     CBC (No Diff); Future  -     Comprehensive Metabolic Panel; Future  -     Lipid Panel; Future  -     gemfibrozil (LOPID) 600 MG tablet; Take 1 tablet by mouth 2 (Two) Times a Day Before Meals.  -     atorvastatin (LIPITOR) 10 MG tablet; Take 1 tablet by mouth Every Night.    Nonrheumatic mitral valve regurgitation  -     CBC (No Diff); Future  -     Comprehensive Metabolic Panel; Future    Palpitation  -     CBC (No Diff); Future  -     TSH; Future    GERD without esophagitis  -     omeprazole (priLOSEC) 20 MG capsule; Take 1 capsule by mouth Daily.    Other male erectile dysfunction  -     sildenafil (REVATIO) 20 MG tablet; Take 1 tablet by mouth 3 (Three) Times a Day. 3 to 5 tablets as needed    Other orders  -     vilazodone (VIIBRYD) 40 MG tablet tablet; Take 1 tablet by mouth Daily.      Heart rate and blood pressure are stable.  Continue lisinopril and carvedilol.  We reviewed his previous cardiac cath findings which showed non-obstructive disease.  He remains asymptomatic, so we will continue managing him medically.  Lipids in December were elevated and he was changed to stronger statin.  We will recheck his labs including fasting lipid, CBC, CMP, TSH to evaluate efficacy of Lipitor.  Continue statin with gemfibrozil.  Copy of labs will be forwarded when  available.  He appears to be stable from a cardiac standpoint.  We will consider repeating stress test once he gets Medicare and his out-of-pocket cost go down.  If he becomes symptomatic prior to this, he agrees to contact office.  Heart healthy diet encouraged low in carbohydrates and saturated fat.  Glucose 109 on last labs.  Limit daily sodium intake.  Weight reduction for BMI closer to 25 encouraged.  90-day refill sent on all of his medications.  We will see him back in 6 months for follow-up.    Patient's Body mass index is 31.86 kg/m². BMI is above normal parameters. Recommendations include: nutrition counseling.                 Electronically signed by GLADYS Prado,  June 30, 2020 11:25

## 2020-07-01 ENCOUNTER — TELEPHONE (OUTPATIENT)
Dept: CARDIOLOGY | Facility: CLINIC | Age: 65
End: 2020-07-01

## 2020-07-01 RX ORDER — SILDENAFIL CITRATE 20 MG/1
TABLET ORAL
Qty: 90 TABLET | Refills: 0 | Status: SHIPPED | OUTPATIENT
Start: 2020-07-01 | End: 2021-01-07 | Stop reason: SDUPTHER

## 2020-07-01 NOTE — TELEPHONE ENCOUNTER
I spoke with patient about results of labs and recommendation to continue same medications . He verbalizes understanding .

## 2020-07-01 NOTE — TELEPHONE ENCOUNTER
Clarification script sent to pharmacy for sildenafil 20mg 3 to 5 tablets prn daily 30 minutes prior to intercourse.

## 2021-01-07 ENCOUNTER — LAB (OUTPATIENT)
Dept: LAB | Facility: HOSPITAL | Age: 66
End: 2021-01-07

## 2021-01-07 ENCOUNTER — OFFICE VISIT (OUTPATIENT)
Dept: CARDIOLOGY | Facility: CLINIC | Age: 66
End: 2021-01-07

## 2021-01-07 VITALS
SYSTOLIC BLOOD PRESSURE: 120 MMHG | BODY MASS INDEX: 32.68 KG/M2 | WEIGHT: 246.6 LBS | HEART RATE: 72 BPM | HEIGHT: 73 IN | TEMPERATURE: 97.3 F | DIASTOLIC BLOOD PRESSURE: 70 MMHG

## 2021-01-07 DIAGNOSIS — E66.9 OBESITY (BMI 30.0-34.9): ICD-10-CM

## 2021-01-07 DIAGNOSIS — I10 ESSENTIAL HYPERTENSION: ICD-10-CM

## 2021-01-07 DIAGNOSIS — I25.10 CORONARY ARTERY DISEASE INVOLVING NATIVE CORONARY ARTERY OF NATIVE HEART WITHOUT ANGINA PECTORIS: Primary | ICD-10-CM

## 2021-01-07 DIAGNOSIS — E78.00 PURE HYPERCHOLESTEROLEMIA: ICD-10-CM

## 2021-01-07 DIAGNOSIS — R00.2 PALPITATION: ICD-10-CM

## 2021-01-07 DIAGNOSIS — I25.10 CORONARY ARTERY DISEASE INVOLVING NATIVE CORONARY ARTERY OF NATIVE HEART WITHOUT ANGINA PECTORIS: ICD-10-CM

## 2021-01-07 DIAGNOSIS — N52.8 OTHER MALE ERECTILE DYSFUNCTION: ICD-10-CM

## 2021-01-07 LAB
ALBUMIN SERPL-MCNC: 5.02 G/DL (ref 3.5–5.2)
ALBUMIN/GLOB SERPL: 1.9 G/DL
ALP SERPL-CCNC: 116 U/L (ref 39–117)
ALT SERPL W P-5'-P-CCNC: 18 U/L (ref 1–41)
ANION GAP SERPL CALCULATED.3IONS-SCNC: 14.3 MMOL/L (ref 5–15)
AST SERPL-CCNC: 20 U/L (ref 1–40)
BILIRUB SERPL-MCNC: 0.5 MG/DL (ref 0–1.2)
BUN SERPL-MCNC: 14 MG/DL (ref 8–23)
BUN/CREAT SERPL: 12.5 (ref 7–25)
CALCIUM SPEC-SCNC: 9.5 MG/DL (ref 8.6–10.5)
CHLORIDE SERPL-SCNC: 100 MMOL/L (ref 98–107)
CHOLEST SERPL-MCNC: 189 MG/DL (ref 0–200)
CO2 SERPL-SCNC: 21.7 MMOL/L (ref 22–29)
CREAT SERPL-MCNC: 1.12 MG/DL (ref 0.76–1.27)
DEPRECATED RDW RBC AUTO: 43.9 FL (ref 37–54)
ERYTHROCYTE [DISTWIDTH] IN BLOOD BY AUTOMATED COUNT: 12.4 % (ref 12.3–15.4)
GFR SERPL CREATININE-BSD FRML MDRD: 66 ML/MIN/1.73
GLOBULIN UR ELPH-MCNC: 2.7 GM/DL
GLUCOSE SERPL-MCNC: 116 MG/DL (ref 65–99)
HCT VFR BLD AUTO: 44.7 % (ref 37.5–51)
HDLC SERPL-MCNC: 41 MG/DL (ref 40–60)
HGB BLD-MCNC: 14.7 G/DL (ref 13–17.7)
LDLC SERPL CALC-MCNC: 130 MG/DL (ref 0–100)
LDLC/HDLC SERPL: 3.14 {RATIO}
MCH RBC QN AUTO: 31.5 PG (ref 26.6–33)
MCHC RBC AUTO-ENTMCNC: 32.9 G/DL (ref 31.5–35.7)
MCV RBC AUTO: 95.9 FL (ref 79–97)
PLATELET # BLD AUTO: 240 10*3/MM3 (ref 140–450)
PMV BLD AUTO: 11.3 FL (ref 6–12)
POTASSIUM SERPL-SCNC: 4.1 MMOL/L (ref 3.5–5.2)
PROT SERPL-MCNC: 7.7 G/DL (ref 6–8.5)
RBC # BLD AUTO: 4.66 10*6/MM3 (ref 4.14–5.8)
SODIUM SERPL-SCNC: 136 MMOL/L (ref 136–145)
TRIGL SERPL-MCNC: 97 MG/DL (ref 0–150)
TSH SERPL DL<=0.05 MIU/L-ACNC: 1.35 UIU/ML (ref 0.27–4.2)
VLDLC SERPL-MCNC: 18 MG/DL (ref 5–40)
WBC # BLD AUTO: 4.27 10*3/MM3 (ref 3.4–10.8)

## 2021-01-07 PROCEDURE — 80061 LIPID PANEL: CPT

## 2021-01-07 PROCEDURE — 99214 OFFICE O/P EST MOD 30 MIN: CPT | Performed by: NURSE PRACTITIONER

## 2021-01-07 PROCEDURE — 36415 COLL VENOUS BLD VENIPUNCTURE: CPT

## 2021-01-07 PROCEDURE — 84443 ASSAY THYROID STIM HORMONE: CPT

## 2021-01-07 PROCEDURE — 80053 COMPREHEN METABOLIC PANEL: CPT

## 2021-01-07 PROCEDURE — 85027 COMPLETE CBC AUTOMATED: CPT

## 2021-01-07 RX ORDER — SILDENAFIL CITRATE 20 MG/1
TABLET ORAL
Qty: 90 TABLET | Refills: 2 | Status: SHIPPED | OUTPATIENT
Start: 2021-01-07 | End: 2022-08-09 | Stop reason: SDUPTHER

## 2021-01-07 NOTE — PROGRESS NOTES
Chief Complaint   Patient presents with   • Follow-up     Cardiac management. Has no cardiac complaints today.   • Labs     Current labs June 2020, results on chart   • Med Refill     Needs refills on Sildenafil to [facility] pharmacy . Had medication list today.        Cardiac Complaints  none      Subjective   Adair Chowdhury is a 65 y.o. male with HTN, hyperlipidemia, and palpitations (in the form of PVC) for which he wore a Holter monitor in 2001. PVCs were noted and he has been managed with beta blocker. Cardiac workup in 2014 showed moderate disease of the LAD which has been managed medically.  Repeat cardiac workup has been discussed due to length of time since last testing but he declined as he has remained asymptomatic and his co-pay is extremely high.     Patient returns today for follow up and denies any cardiac concerns. Chest pain, SOA, palpitations, dizziness, and syncope denies. Labs remain followed by our office and were last checked June 2020 and show: HH 14.1/43.9, TRIG 72, HDL 42, LDL 90, TSH 1.050, BUN 10, Creatinine 1.05, Na 135, K 4.7, ALT 24, AST 23.  Refills sildenafil requested.       Cardiac History  Past Surgical History:   Procedure Laterality Date   • CARDIAC CATHETERIZATION  03/10/2014    Cath- EF 60%, 30% LAD.   • CARDIAC CATHETERIZATION  11/10/2005    Cath- Normal including Renal   • CARDIOVASCULAR STRESS TEST  10/13/2005    Stress- 8 min 96% THR. /100 Small Ischemia in Lateral Wall   • CARDIOVASCULAR STRESS TEST  10/14/2009    Stress- 8 min, 97% THR. Negative, Increase Coreg to 12.5 mg BID   • CARDIOVASCULAR STRESS TEST  02/24/2014    Stress- 7 min, 93% THR, 172/90. R/O Inferior Ischemia, Lisinopril added   • CONVERTED (HISTORICAL) HOLTER  05/30/2001    Holter-Frequent PVC's   • ECHO - CONVERTED  08/29/2006    Echo- Normal (Dr. Resendez)   • ECHO - CONVERTED  10/14/2009    Echo-EF >60%, aortic root- 4.2cm   • ECHO - CONVERTED  02/24/2014    Echo-EF 60-65%       Current Outpatient  Medications   Medication Sig Dispense Refill   • aspirin (aspirin) 81 MG EC tablet Take 2 tablets by mouth Daily. 180 tablet 3   • atorvastatin (LIPITOR) 10 MG tablet Take 1 tablet by mouth Every Night. 90 tablet 3   • carvedilol (COREG) 12.5 MG tablet Take 1 tablet by mouth 2 (Two) Times a Day With Meals. 180 tablet 3   • gemfibrozil (LOPID) 600 MG tablet Take 1 tablet by mouth 2 (Two) Times a Day Before Meals. 90 tablet 3   • lisinopril (PRINIVIL,ZESTRIL) 5 MG tablet Take 1 tablet by mouth Daily. 90 tablet 3   • omeprazole (priLOSEC) 20 MG capsule Take 1 capsule by mouth Daily. 90 capsule 3   • sildenafil (REVATIO) 20 MG tablet 3 to 5 tablets as needed daily 30 minutes prior to intercourse 90 tablet 2   • vilazodone (VIIBRYD) 40 MG tablet tablet Take 1 tablet by mouth Daily. 90 tablet 3     No current facility-administered medications for this visit.        Patient has no known allergies.    Past Medical History:   Diagnosis Date   • Abnormal pulse oximetry 03/10/2011    Overnight pulse ox- Negative   • H pylori ulcer    • Heart murmur    • Heart murmur    • History of hernia surgery    • History of tonsillectomy    • Hyperlipidemia    • Hypertension    • LV dysfunction     mild       Social History     Socioeconomic History   • Marital status:      Spouse name: Not on file   • Number of children: Not on file   • Years of education: Not on file   • Highest education level: Not on file   Tobacco Use   • Smoking status: Never Smoker   • Smokeless tobacco: Never Used   Substance and Sexual Activity   • Alcohol use: No   • Drug use: No       Family History   Problem Relation Age of Onset   • Heart disease Mother    • Hypertension Mother    • Hyperlipidemia Mother    • Heart disease Father    • Heart attack Father    • Hyperlipidemia Father    • Hypertension Father    • Heart failure Father    • Diabetes Other    • Hypertension Other    • Hyperlipidemia Other    • No Known Problems Child        Review of Systems  "  Constitution: Negative for malaise/fatigue and night sweats.   Cardiovascular: Negative for chest pain, claudication, dyspnea on exertion, irregular heartbeat, leg swelling, near-syncope, orthopnea, palpitations and syncope.   Respiratory: Negative for cough, shortness of breath and wheezing.    Musculoskeletal: Positive for joint pain and stiffness.   Gastrointestinal: Negative for anorexia, heartburn, melena, nausea and vomiting.   Genitourinary: Negative for dysuria, hematuria, hesitancy and nocturia.   Neurological: Negative for dizziness, light-headedness and loss of balance.   Psychiatric/Behavioral: Negative for depression and memory loss. The patient is not nervous/anxious.            Objective     /70 (BP Location: Left arm)   Pulse 72   Temp 97.3 °F (36.3 °C)   Ht 185.4 cm (73\")   Wt 112 kg (246 lb 9.6 oz)   BMI 32.53 kg/m²     Constitutional:       Appearance: Healthy appearance. Not in distress.   Eyes:      Pupils: Pupils are equal, round, and reactive to light.   HENT:      Nose: Nose normal.   Neck:      Musculoskeletal: Normal range of motion and neck supple.   Pulmonary:      Effort: Pulmonary effort is normal.      Breath sounds: Normal breath sounds.   Cardiovascular:      PMI at left midclavicular line. Normal rate. Regular rhythm.      Murmurs: There is a systolic murmur.   Abdominal:      Palpations: Abdomen is soft.   Musculoskeletal: Normal range of motion.   Skin:     General: Skin is warm and dry.   Neurological:      Mental Status: Oriented to person, place and time.         Procedures    Assessment/Plan     Palpitations:  Will continue on BB therapy. Palpitations denied. No changes to current coreg dosing continued. Limited caffeine advised.     HTN:  Blood pressure stable. Lisinopril and coreg to be continued at current.     Cardiac status:  Stable. No new concerns are voiced. Chest pain, SOA, palpitations, dizziness, and syncope are denied. ASA therapy to be continued at " current. He continues to decline workup due to current asymptomatic status and costs.    Hyperlipidemia:  Patient taking both statin and fenofibrate therapy. FLP will be rechecked by our office, more recommendations to follow. Most recent FLP showed lipids at goal.     ED:  Refills of revatio sent per request.    BMI noted at 32.53, good cardiac diet with limited carbs, calories, and activity as tolerated advised.     6 month follow up recommended or sooner if needed.         Problems Addressed this Visit        Other    Hyperlipemia    Relevant Orders    CBC (No Diff) (Completed)    Comprehensive Metabolic Panel (Completed)    TSH (Completed)    Lipid Panel (Completed)    HTN (hypertension)    Relevant Orders    CBC (No Diff) (Completed)    Comprehensive Metabolic Panel (Completed)    TSH (Completed)    Lipid Panel (Completed)    Palpitation    Relevant Orders    CBC (No Diff) (Completed)    Comprehensive Metabolic Panel (Completed)    TSH (Completed)    Lipid Panel (Completed)    Coronary artery disease involving native coronary artery of native heart without angina pectoris - Primary    Relevant Medications    sildenafil (REVATIO) 20 MG tablet    Other Relevant Orders    CBC (No Diff) (Completed)    Comprehensive Metabolic Panel (Completed)    TSH (Completed)    Lipid Panel (Completed)    Other male erectile dysfunction    Relevant Medications    sildenafil (REVATIO) 20 MG tablet      Other Visit Diagnoses     Obesity (BMI 30.0-34.9)          Diagnoses       Codes Comments    Coronary artery disease involving native coronary artery of native heart without angina pectoris    -  Primary ICD-10-CM: I25.10  ICD-9-CM: 414.01     Essential hypertension     ICD-10-CM: I10  ICD-9-CM: 401.9     Pure hypercholesterolemia     ICD-10-CM: E78.00  ICD-9-CM: 272.0     Palpitation     ICD-10-CM: R00.2  ICD-9-CM: 785.1     Other male erectile dysfunction     ICD-10-CM: N52.8  ICD-9-CM: 607.84     Obesity (BMI 30.0-34.9)      ICD-10-CM: E66.9  ICD-9-CM: 278.00           Patient's Body mass index is 32.53 kg/m². BMI is above normal parameters. Recommendations include: nutrition counseling.                Electronically signed by GLADYS Akers January 8, 2021 10:57 EST

## 2021-01-08 RX ORDER — ATORVASTATIN CALCIUM 20 MG/1
20 TABLET, FILM COATED ORAL NIGHTLY
Qty: 90 TABLET | Refills: 3 | Status: SHIPPED | OUTPATIENT
Start: 2021-01-08 | End: 2022-01-27 | Stop reason: SDUPTHER

## 2021-01-08 NOTE — TELEPHONE ENCOUNTER
Patient was made aware of results of lab work. Patient was made aware that LDL was 130 and was made aware of recommendation to increase lipitor from 10 mg to 20 mg. Script for lipitor 20 mg nightly pended to be sent to [facility] with 90 tablets and 3 refills.

## 2021-01-08 NOTE — TELEPHONE ENCOUNTER
----- Message from GLADYS Feldman sent at 1/7/2021  5:14 PM EST -----  Increase lipitor to 20mg.  LDL increased from prior. Rest of labs look good.

## 2021-01-11 DIAGNOSIS — E78.00 PURE HYPERCHOLESTEROLEMIA: ICD-10-CM

## 2021-01-12 RX ORDER — GEMFIBROZIL 600 MG/1
TABLET, FILM COATED ORAL
Qty: 180 TABLET | Refills: 3 | Status: SHIPPED | OUTPATIENT
Start: 2021-01-12 | End: 2022-01-27 | Stop reason: SDUPTHER

## 2021-03-03 ENCOUNTER — TELEPHONE (OUTPATIENT)
Dept: CARDIOLOGY | Facility: CLINIC | Age: 66
End: 2021-03-03

## 2021-03-03 NOTE — TELEPHONE ENCOUNTER
Patient called stating that Viibryd is not going to be covered any longer. It looks like it was sent in last June 2020. Do you want to change or does patient need to talk to PCP?

## 2021-07-12 ENCOUNTER — OFFICE VISIT (OUTPATIENT)
Dept: CARDIOLOGY | Facility: CLINIC | Age: 66
End: 2021-07-12

## 2021-07-12 VITALS
WEIGHT: 244.8 LBS | SYSTOLIC BLOOD PRESSURE: 110 MMHG | DIASTOLIC BLOOD PRESSURE: 70 MMHG | HEIGHT: 73 IN | HEART RATE: 70 BPM | BODY MASS INDEX: 32.44 KG/M2

## 2021-07-12 DIAGNOSIS — I10 ESSENTIAL HYPERTENSION: ICD-10-CM

## 2021-07-12 DIAGNOSIS — K21.9 GERD WITHOUT ESOPHAGITIS: ICD-10-CM

## 2021-07-12 DIAGNOSIS — I34.0 NONRHEUMATIC MITRAL VALVE REGURGITATION: ICD-10-CM

## 2021-07-12 DIAGNOSIS — I25.10 CORONARY ARTERY DISEASE INVOLVING NATIVE CORONARY ARTERY OF NATIVE HEART WITHOUT ANGINA PECTORIS: Primary | ICD-10-CM

## 2021-07-12 DIAGNOSIS — E78.00 PURE HYPERCHOLESTEROLEMIA: ICD-10-CM

## 2021-07-12 PROCEDURE — 99213 OFFICE O/P EST LOW 20 MIN: CPT | Performed by: NURSE PRACTITIONER

## 2021-07-12 RX ORDER — OMEPRAZOLE 20 MG/1
20 CAPSULE, DELAYED RELEASE ORAL DAILY
Qty: 90 CAPSULE | Refills: 3 | Status: SHIPPED | OUTPATIENT
Start: 2021-07-12 | End: 2022-01-27 | Stop reason: SDUPTHER

## 2021-07-12 RX ORDER — LISINOPRIL 5 MG/1
5 TABLET ORAL DAILY
Qty: 90 TABLET | Refills: 3 | Status: SHIPPED | OUTPATIENT
Start: 2021-07-12 | End: 2022-01-27 | Stop reason: SDUPTHER

## 2021-07-12 RX ORDER — ASPIRIN 81 MG/1
162 TABLET ORAL DAILY
Qty: 180 TABLET | Refills: 3 | Status: SHIPPED | OUTPATIENT
Start: 2021-07-12

## 2021-07-12 RX ORDER — CITALOPRAM 20 MG/1
20 TABLET ORAL DAILY
COMMUNITY
End: 2022-01-27 | Stop reason: SDUPTHER

## 2021-07-12 RX ORDER — NITROGLYCERIN 0.4 MG/1
TABLET SUBLINGUAL
Qty: 25 TABLET | Refills: 0 | Status: SHIPPED | OUTPATIENT
Start: 2021-07-12

## 2021-07-12 RX ORDER — CARVEDILOL 12.5 MG/1
12.5 TABLET ORAL 2 TIMES DAILY WITH MEALS
Qty: 180 TABLET | Refills: 3 | Status: SHIPPED | OUTPATIENT
Start: 2021-07-12 | End: 2022-01-27 | Stop reason: SDUPTHER

## 2021-07-12 RX ORDER — ATORVASTATIN CALCIUM 10 MG/1
10 TABLET, FILM COATED ORAL DAILY
COMMUNITY
End: 2021-07-12

## 2021-07-12 NOTE — PROGRESS NOTES
Chief Complaint   Patient presents with   • Follow-up     Cardiac management. Has no cardiac complaints today   • LABS     Labs from January 2021 on chart   • Med Refill     Needs refills on  Lipitor 10 mg, Aspirin, Coreg, Lisinoproil, Omeprazole 90 day suppy to [facility] pharmacy       Subjective       Adair Chowdhury is a 66 y.o. male with HTN, hyperlipidemia, and palpitations (in the form of PVC) for which he wore a Holter monitor in 2001. PVCs were noted and he has been managed with beta blocker. Cardiac workup in 2014 showed moderate disease of the LAD which has been managed medically.  Repeat cardiac workup has been discussed due to length of time since last testing but he declined as he has remained asymptomatic and his co-pay is extremely high.    January 2021 labs showed increase in LDL.  The dose of Lipitor was increased.    Today he comes the office for follow-up visit.  He is maintaining activities including yard work without symptoms of concern.  No chest pain, palpitations, edema, or inappropriate shortness of breath noted.  He continues Lipitor without side effects.  Overall, he feels he is doing very well.    Cardiac History:    Past Surgical History:   Procedure Laterality Date   • CARDIAC CATHETERIZATION  03/10/2014    Cath- EF 60%, 30% LAD.   • CARDIAC CATHETERIZATION  11/10/2005    Cath- Normal including Renal   • CARDIOVASCULAR STRESS TEST  10/13/2005    Stress- 8 min 96% THR. /100 Small Ischemia in Lateral Wall   • CARDIOVASCULAR STRESS TEST  10/14/2009    Stress- 8 min, 97% THR. Negative, Increase Coreg to 12.5 mg BID   • CARDIOVASCULAR STRESS TEST  02/24/2014    Stress- 7 min, 93% THR, 172/90. R/O Inferior Ischemia, Lisinopril added   • CONVERTED (HISTORICAL) HOLTER  05/30/2001    Holter-Frequent PVC's   • ECHO - CONVERTED  08/29/2006    Echo- Normal (Dr. Resendez)   • ECHO - CONVERTED  10/14/2009    Echo-EF >60%, aortic root- 4.2cm   • ECHO - CONVERTED  02/24/2014    Echo-EF 60-65%        Current Outpatient Medications   Medication Sig Dispense Refill   • aspirin (aspirin) 81 MG EC tablet Take 2 tablets by mouth Daily. 180 tablet 3   • carvedilol (COREG) 12.5 MG tablet Take 1 tablet by mouth 2 (Two) Times a Day With Meals. 180 tablet 3   • citalopram (CeleXA) 20 MG tablet Take 20 mg by mouth Daily.     • gemfibrozil (LOPID) 600 MG tablet TAKE 1 TABLET BY MOUTH TWICE DAILY BEFORE MEAL(S) 180 tablet 3   • lisinopril (PRINIVIL,ZESTRIL) 5 MG tablet Take 1 tablet by mouth Daily. 90 tablet 3   • omeprazole (priLOSEC) 20 MG capsule Take 1 capsule by mouth Daily. 90 capsule 3   • sildenafil (REVATIO) 20 MG tablet 3 to 5 tablets as needed daily 30 minutes prior to intercourse 90 tablet 2   • atorvastatin (LIPITOR) 20 MG tablet Take 1 tablet by mouth Every Night. 90 tablet 3   • nitroglycerin (NITROSTAT) 0.4 MG SL tablet 1 under the tongue as needed for angina, may repeat q5mins for up three doses 25 tablet 0     No current facility-administered medications for this visit.       Patient has no known allergies.    Past Medical History:   Diagnosis Date   • Abnormal pulse oximetry 03/10/2011    Overnight pulse ox- Negative   • H pylori ulcer    • Heart murmur    • Heart murmur    • History of hernia surgery    • History of tonsillectomy    • Hyperlipidemia    • Hypertension    • LV dysfunction     mild       Social History     Socioeconomic History   • Marital status:      Spouse name: Not on file   • Number of children: Not on file   • Years of education: Not on file   • Highest education level: Not on file   Tobacco Use   • Smoking status: Never Smoker   • Smokeless tobacco: Never Used   Vaping Use   • Vaping Use: Never used   Substance and Sexual Activity   • Alcohol use: No   • Drug use: No       Family History   Problem Relation Age of Onset   • Heart disease Mother    • Hypertension Mother    • Hyperlipidemia Mother    • Heart disease Father    • Heart attack Father    • Hyperlipidemia Father   "  • Hypertension Father    • Heart failure Father    • Diabetes Other    • Hypertension Other    • Hyperlipidemia Other    • No Known Problems Child        Review of Systems   Constitutional: Negative for decreased appetite, diaphoresis and malaise/fatigue.   HENT: Negative for nosebleeds.    Eyes: Negative for blurred vision.   Cardiovascular: Negative for chest pain, claudication, cyanosis, dyspnea on exertion, irregular heartbeat, leg swelling, near-syncope, orthopnea, palpitations, paroxysmal nocturnal dyspnea and syncope.   Respiratory: Negative for shortness of breath and snoring.    Endocrine: Negative for cold intolerance and heat intolerance.   Hematologic/Lymphatic: Does not bruise/bleed easily.   Skin: Negative for rash.   Musculoskeletal: Negative for myalgias.   Gastrointestinal: Negative for heartburn, melena and nausea.   Genitourinary: Negative for dysuria and hematuria.   Neurological: Negative for dizziness and light-headedness.   Psychiatric/Behavioral: The patient does not have insomnia and is not nervous/anxious.         BP Readings from Last 5 Encounters:   07/12/21 110/70   01/07/21 120/70   06/30/20 136/70   12/17/19 132/80   06/17/19 140/80       Wt Readings from Last 5 Encounters:   07/12/21 111 kg (244 lb 12.8 oz)   01/07/21 112 kg (246 lb 9.6 oz)   06/30/20 109 kg (241 lb 6.4 oz)   12/17/19 109 kg (240 lb)   06/17/19 113 kg (249 lb)       Objective      Labs 1/7/2021: TSH 1.3 5, total cholesterol 189, triglycerides 97, HDL 41, , CBC within normal range, glucose 116, BUN 40, creatinine 1.12, sodium 136, potassium 4.1, chloride 100, CO2 21.7, calcium 9.5, total protein 7.7, being 5.02, ALT 18, AST 20, ALP under 16, total bili 2.5, GFR 66    /70 (BP Location: Left arm)   Pulse 70   Ht 185.4 cm (73\")   Wt 111 kg (244 lb 12.8 oz)   BMI 32.30 kg/m²     Vitals and nursing note reviewed.   Eyes:      Pupils: Pupils are equal, round, and reactive to light.   HENT:      Head: " Normocephalic.   Neck:      Vascular: No carotid bruit.   Pulmonary:      Breath sounds: Normal breath sounds.   Cardiovascular:      Normal rate. Regular rhythm.      Murmurs: There is a grade 2/6 systolic murmur.   Pulses:     Intact distal pulses.   Edema:     Peripheral edema absent.   Abdominal:      General: Bowel sounds are normal.      Palpations: Abdomen is soft.   Musculoskeletal: Normal range of motion.      Cervical back: Normal range of motion. Skin:     General: Skin is warm.   Neurological:      Mental Status: Alert and oriented to person, place, and time.          Procedures : none today          Assessment/Plan   Diagnoses and all orders for this visit:    1. Coronary artery disease involving native coronary artery of native heart without angina pectoris (Primary)  -     aspirin (aspirin) 81 MG EC tablet; Take 2 tablets by mouth Daily.  Dispense: 180 tablet; Refill: 3    2. Essential hypertension  -     carvedilol (COREG) 12.5 MG tablet; Take 1 tablet by mouth 2 (Two) Times a Day With Meals.  Dispense: 180 tablet; Refill: 3  -     lisinopril (PRINIVIL,ZESTRIL) 5 MG tablet; Take 1 tablet by mouth Daily.  Dispense: 90 tablet; Refill: 3    3. Pure hypercholesterolemia    4. Nonrheumatic mitral valve regurgitation    5. GERD without esophagitis  -     omeprazole (priLOSEC) 20 MG capsule; Take 1 capsule by mouth Daily.  Dispense: 90 capsule; Refill: 3    Other orders  -     nitroglycerin (NITROSTAT) 0.4 MG SL tablet; 1 under the tongue as needed for angina, may repeat q5mins for up three doses  Dispense: 25 tablet; Refill: 0      CAD-patient presents today without cardiac plaints or concerns.  Catheterization in 2014 showed 30% disease LAD.  He remains on Lipitor, high intensity statin therapy, without side effects noted.  We discussed repeat cardiac work-up due to length of time but denied due to remaining asymptomatic and co-pay cost.  Prescription for Nitrostat given an informational handout on how to  use.  Should he have to take Nitrostat he understands to contact the office or go the emergency room if symptoms not relieved.    Hypertension-blood pressure normal.  Continue same dose lisinopril and carvedilol.    Hypercholesterolemia-January labs showed slight increase in LDL.  Refills given to continue Lipitor 20 mg daily.  At next visit we will plan to repeat labs.  Should he develop any side effects he understands to call.    MR-clinically stable. No repeat testing warranted.    GERD-no recent symptoms.  Refills given for omeprazole.  Recent labs showed normal renal function test.    Patient's Body mass index is 32.3 kg/m². indicating that he is obese (BMI >30). Obesity-related health conditions include the following: hypertension and coronary heart disease. Obesity is unchanged. BMI is is above average; BMI management plan is completed. We discussed portion control and increasing exercise..     Patient here stable from a cardiac standpoint.  A 6-month follow-up visit scheduled.  Please call sooner for chronic concerns.           Electronically signed by GLADYS Alvarez,  July 12, 2021 08:53 EDT

## 2021-07-12 NOTE — PATIENT INSTRUCTIONS
Advance Care Planning and Advance Directives     You make decisions on a daily basis - decisions about where you want to live, your career, your home, your life. Perhaps one of the most important decisions you face is your choice for future medical care. Take time to talk with your family and your healthcare team and start planning today.  Advance Care Planning is a process that can help you:  · Understand possible future healthcare decisions in light of your own experiences  · Reflect on those decision in light of your goals and values  · Discuss your decisions with those closest to you and the healthcare professionals that care for you  · Make a plan by creating a document that reflects your wishes    Surrogate Decision Maker  In the event of a medical emergency, which has left you unable to communicate or to make your own decisions, you would need someone to make decisions for you.  It is important to discuss your preferences for medical treatment with this person while you are in good health.     Qualities of a surrogate decision maker:  • Willing to take on this role and responsibility  • Knows what you want for future medical care  • Willing to follow your wishes even if they don't agree with them  • Able to make difficult medical decisions under stressful circumstances    Advance Directives  These are legal documents you can create that will guide your healthcare team and decision maker(s) when needed. These documents can be stored in the electronic medical record.    · Living Will - a legal document to guide your care if you have a terminal condition or a serious illness and are unable to communicate. States vary by statute in document names/types, but most forms may include one or more of the following:        -  Directions regarding life-prolonging treatments        -  Directions regarding artificially provided nutrition/hydration        -  Choosing a healthcare decision maker        -  Direction  regarding organ/tissue donation    · Durable Power of  for Healthcare - this document names an -in-fact to make medical decisions for you, but it may also allow this person to make personal and financial decisions for you. Please seek the advice of an  if you need this type of document.    **Advance Directives are not required and no one may discriminate against you if you do not sign one.    Medical Orders  Many states allow specific forms/orders signed by your physician to record your wishes for medical treatment in your current state of health. This form, signed in personal communication with your physician, addresses resuscitation and other medical interventions that you may or may not want.      For more information or to schedule a time with a [facility] Advance Care Planning Facilitator contact: [facility] or call [phone] and someone will contact you directly.Nitroglycerin sublingual tablets  What is this medicine?  NITROGLYCERIN (lana troe GLI ser in) is a type of vasodilator. It relaxes blood vessels, increasing the blood and oxygen supply to your heart. This medicine is used to relieve chest pain caused by angina. It is also used to prevent chest pain before activities like climbing stairs, going outdoors in cold weather, or sexual activity.  This medicine may be used for other purposes; ask your health care provider or pharmacist if you have questions.  COMMON BRAND NAME(S): Nitroquick, Nitrostat, Nitrotab  What should I tell my health care provider before I take this medicine?  They need to know if you have any of these conditions:  · anemia  · head injury, recent stroke, or bleeding in the brain  · liver disease  · previous heart attack  · an unusual or allergic reaction to nitroglycerin, other medicines, foods, dyes, or preservatives  · pregnant or trying to get pregnant  · breast-feeding  How should I use this medicine?  Take this medicine by mouth as needed.  At the first sign of an angina attack (chest pain or tightness) place one tablet under your tongue. You can also take this medicine 5 to 10 minutes before an event likely to produce chest pain. Follow the directions on the prescription label. Let the tablet dissolve under the tongue. Do not swallow whole. Replace the dose if you accidentally swallow it. It will help if your mouth is not dry. Saliva around the tablet will help it to dissolve more quickly. Do not eat or drink, smoke or chew tobacco while a tablet is dissolving. If you are not better within 5 minutes after taking ONE dose of nitroglycerin, call 9-1-1 immediately to seek emergency medical care. Do not take more than 3 nitroglycerin tablets over 15 minutes.  If you take this medicine often to relieve symptoms of angina, your doctor or health care professional may provide you with different instructions to manage your symptoms. If symptoms do not go away after following these instructions, it is important to call 9-1-1 immediately. Do not take more than 3 nitroglycerin tablets over 15 minutes.  Talk to your pediatrician regarding the use of this medicine in children. Special care may be needed.  Overdosage: If you think you have taken too much of this medicine contact a poison control center or emergency room at once.  NOTE: This medicine is only for you. Do not share this medicine with others.  What if I miss a dose?  This does not apply. This medicine is only used as needed.  What may interact with this medicine?  Do not take this medicine with any of the following medications:  · certain migraine medicines like ergotamine and dihydroergotamine (DHE)  · medicines used to treat erectile dysfunction like sildenafil, tadalafil, and vardenafil  · riociguat  This medicine may also interact with the following medications:  · alteplase  · aspirin  · heparin  · medicines for high blood pressure  · medicines for mental depression  · other medicines used to treat  angina  · phenothiazines like chlorpromazine, mesoridazine, prochlorperazine, thioridazine  This list may not describe all possible interactions. Give your health care provider a list of all the medicines, herbs, non-prescription drugs, or dietary supplements you use. Also tell them if you smoke, drink alcohol, or use illegal drugs. Some items may interact with your medicine.  What should I watch for while using this medicine?  Tell your doctor or health care professional if you feel your medicine is no longer working.  Keep this medicine with you at all times. Sit or lie down when you take your medicine to prevent falling if you feel dizzy or faint after using it. Try to remain calm. This will help you to feel better faster. If you feel dizzy, take several deep breaths and lie down with your feet propped up, or bend forward with your head resting between your knees.  You may get drowsy or dizzy. Do not drive, use machinery, or do anything that needs mental alertness until you know how this drug affects you. Do not stand or sit up quickly, especially if you are an older patient. This reduces the risk of dizzy or fainting spells. Alcohol can make you more drowsy and dizzy. Avoid alcoholic drinks.  Do not treat yourself for coughs, colds, or pain while you are taking this medicine without asking your doctor or health care professional for advice. Some ingredients may increase your blood pressure.  What side effects may I notice from receiving this medicine?  Side effects that you should report to your doctor or health care professional as soon as possible:  · blurred vision  · dry mouth  · skin rash  · sweating  · the feeling of extreme pressure in the head  · unusually weak or tired  Side effects that usually do not require medical attention (report to your doctor or health care professional if they continue or are bothersome):  · flushing of the face or neck  · headache  · irregular heartbeat,  palpitations  · nausea, vomiting  This list may not describe all possible side effects. Call your doctor for medical advice about side effects. You may report side effects to FDA at 9-266-DZX-3467.  Where should I keep my medicine?  Keep out of the reach of children.  Store at room temperature between 20 and 25 degrees C (68 and 77 degrees F). Store in original container. Protect from light and moisture. Keep tightly closed. Throw away any unused medicine after the expiration date.  NOTE: This sheet is a summary. It may not cover all possible information. If you have questions about this medicine, talk to your doctor, pharmacist, or health care provider.  © 2021 [employer] (2014-10-16 17:57:36)

## 2022-01-27 ENCOUNTER — OFFICE VISIT (OUTPATIENT)
Dept: CARDIOLOGY | Facility: CLINIC | Age: 67
End: 2022-01-27

## 2022-01-27 ENCOUNTER — LAB (OUTPATIENT)
Dept: LAB | Facility: HOSPITAL | Age: 67
End: 2022-01-27

## 2022-01-27 VITALS
HEIGHT: 73 IN | BODY MASS INDEX: 33.32 KG/M2 | HEART RATE: 60 BPM | TEMPERATURE: 96.1 F | WEIGHT: 251.4 LBS | DIASTOLIC BLOOD PRESSURE: 80 MMHG | SYSTOLIC BLOOD PRESSURE: 138 MMHG

## 2022-01-27 DIAGNOSIS — K21.9 GERD WITHOUT ESOPHAGITIS: ICD-10-CM

## 2022-01-27 DIAGNOSIS — M25.571 PAIN IN JOINTS OF BOTH FEET: ICD-10-CM

## 2022-01-27 DIAGNOSIS — R35.1 NOCTURIA: ICD-10-CM

## 2022-01-27 DIAGNOSIS — I25.10 CORONARY ARTERY DISEASE INVOLVING NATIVE CORONARY ARTERY OF NATIVE HEART WITHOUT ANGINA PECTORIS: ICD-10-CM

## 2022-01-27 DIAGNOSIS — R73.9 HYPERGLYCEMIA: ICD-10-CM

## 2022-01-27 DIAGNOSIS — M25.572 PAIN IN JOINTS OF BOTH FEET: Primary | ICD-10-CM

## 2022-01-27 DIAGNOSIS — I10 PRIMARY HYPERTENSION: ICD-10-CM

## 2022-01-27 DIAGNOSIS — M25.572 PAIN IN JOINTS OF BOTH FEET: ICD-10-CM

## 2022-01-27 DIAGNOSIS — M79.10 MYALGIA: ICD-10-CM

## 2022-01-27 DIAGNOSIS — E78.00 PURE HYPERCHOLESTEROLEMIA: ICD-10-CM

## 2022-01-27 DIAGNOSIS — I10 ESSENTIAL HYPERTENSION: ICD-10-CM

## 2022-01-27 DIAGNOSIS — E55.9 VITAMIN D DEFICIENCY: ICD-10-CM

## 2022-01-27 DIAGNOSIS — M25.571 PAIN IN JOINTS OF BOTH FEET: Primary | ICD-10-CM

## 2022-01-27 LAB
ALBUMIN SERPL-MCNC: 5.03 G/DL (ref 3.5–5.2)
ALBUMIN/GLOB SERPL: 2 G/DL
ALP SERPL-CCNC: 102 U/L (ref 39–117)
ALT SERPL W P-5'-P-CCNC: 18 U/L (ref 1–41)
ANION GAP SERPL CALCULATED.3IONS-SCNC: 14.4 MMOL/L (ref 5–15)
AST SERPL-CCNC: 22 U/L (ref 1–40)
BASOPHILS # BLD AUTO: 0.05 10*3/MM3 (ref 0–0.2)
BASOPHILS NFR BLD AUTO: 1 % (ref 0–1.5)
BILIRUB SERPL-MCNC: 0.5 MG/DL (ref 0–1.2)
BUN SERPL-MCNC: 11 MG/DL (ref 8–23)
BUN/CREAT SERPL: 9.6 (ref 7–25)
CALCIUM SPEC-SCNC: 9.4 MG/DL (ref 8.6–10.5)
CHLORIDE SERPL-SCNC: 102 MMOL/L (ref 98–107)
CHOLEST SERPL-MCNC: 176 MG/DL (ref 0–200)
CK SERPL-CCNC: 170 U/L (ref 20–200)
CO2 SERPL-SCNC: 21.6 MMOL/L (ref 22–29)
CREAT SERPL-MCNC: 1.14 MG/DL (ref 0.76–1.27)
DEPRECATED RDW RBC AUTO: 42.5 FL (ref 37–54)
EOSINOPHIL # BLD AUTO: 0.37 10*3/MM3 (ref 0–0.4)
EOSINOPHIL NFR BLD AUTO: 7.1 % (ref 0.3–6.2)
ERYTHROCYTE [DISTWIDTH] IN BLOOD BY AUTOMATED COUNT: 12.2 % (ref 12.3–15.4)
ERYTHROCYTE [SEDIMENTATION RATE] IN BLOOD: 2 MM/HR (ref 0–20)
GFR SERPL CREATININE-BSD FRML MDRD: 64 ML/MIN/1.73
GLOBULIN UR ELPH-MCNC: 2.6 GM/DL
GLUCOSE SERPL-MCNC: 111 MG/DL (ref 65–99)
HBA1C MFR BLD: 6 % (ref 4.8–5.6)
HCT VFR BLD AUTO: 45.2 % (ref 37.5–51)
HDLC SERPL-MCNC: 42 MG/DL (ref 40–60)
HGB BLD-MCNC: 14.5 G/DL (ref 13–17.7)
IMM GRANULOCYTES # BLD AUTO: 0.02 10*3/MM3 (ref 0–0.05)
IMM GRANULOCYTES NFR BLD AUTO: 0.4 % (ref 0–0.5)
LDLC SERPL CALC-MCNC: 118 MG/DL (ref 0–100)
LDLC/HDLC SERPL: 2.79 {RATIO}
LYMPHOCYTES # BLD AUTO: 1.5 10*3/MM3 (ref 0.7–3.1)
LYMPHOCYTES NFR BLD AUTO: 28.8 % (ref 19.6–45.3)
MCH RBC QN AUTO: 30.6 PG (ref 26.6–33)
MCHC RBC AUTO-ENTMCNC: 32.1 G/DL (ref 31.5–35.7)
MCV RBC AUTO: 95.4 FL (ref 79–97)
MONOCYTES # BLD AUTO: 0.44 10*3/MM3 (ref 0.1–0.9)
MONOCYTES NFR BLD AUTO: 8.5 % (ref 5–12)
NEUTROPHILS NFR BLD AUTO: 2.82 10*3/MM3 (ref 1.7–7)
NEUTROPHILS NFR BLD AUTO: 54.2 % (ref 42.7–76)
NRBC BLD AUTO-RTO: 0 /100 WBC (ref 0–0.2)
PLATELET # BLD AUTO: 272 10*3/MM3 (ref 140–450)
PMV BLD AUTO: 11.3 FL (ref 6–12)
POTASSIUM SERPL-SCNC: 4.5 MMOL/L (ref 3.5–5.2)
PROT SERPL-MCNC: 7.6 G/DL (ref 6–8.5)
RBC # BLD AUTO: 4.74 10*6/MM3 (ref 4.14–5.8)
SODIUM SERPL-SCNC: 138 MMOL/L (ref 136–145)
TRIGL SERPL-MCNC: 84 MG/DL (ref 0–150)
TSH SERPL DL<=0.05 MIU/L-ACNC: 1.46 UIU/ML (ref 0.27–4.2)
URATE SERPL-MCNC: 6.3 MG/DL (ref 3.4–7)
VLDLC SERPL-MCNC: 16 MG/DL (ref 5–40)
WBC NRBC COR # BLD: 5.2 10*3/MM3 (ref 3.4–10.8)

## 2022-01-27 PROCEDURE — 99213 OFFICE O/P EST LOW 20 MIN: CPT | Performed by: NURSE PRACTITIONER

## 2022-01-27 PROCEDURE — 85025 COMPLETE CBC W/AUTO DIFF WBC: CPT

## 2022-01-27 PROCEDURE — 82550 ASSAY OF CK (CPK): CPT

## 2022-01-27 PROCEDURE — 82306 VITAMIN D 25 HYDROXY: CPT

## 2022-01-27 PROCEDURE — 84443 ASSAY THYROID STIM HORMONE: CPT

## 2022-01-27 PROCEDURE — 85652 RBC SED RATE AUTOMATED: CPT

## 2022-01-27 PROCEDURE — 84153 ASSAY OF PSA TOTAL: CPT

## 2022-01-27 PROCEDURE — 80053 COMPREHEN METABOLIC PANEL: CPT

## 2022-01-27 PROCEDURE — 83036 HEMOGLOBIN GLYCOSYLATED A1C: CPT

## 2022-01-27 PROCEDURE — 36415 COLL VENOUS BLD VENIPUNCTURE: CPT

## 2022-01-27 PROCEDURE — 84550 ASSAY OF BLOOD/URIC ACID: CPT

## 2022-01-27 PROCEDURE — 80061 LIPID PANEL: CPT

## 2022-01-27 RX ORDER — CARVEDILOL 12.5 MG/1
12.5 TABLET ORAL 2 TIMES DAILY WITH MEALS
Qty: 180 TABLET | Refills: 3 | Status: SHIPPED | OUTPATIENT
Start: 2022-01-27 | End: 2022-08-09 | Stop reason: SDUPTHER

## 2022-01-27 RX ORDER — ATORVASTATIN CALCIUM 20 MG/1
20 TABLET, FILM COATED ORAL NIGHTLY
Qty: 90 TABLET | Refills: 3 | Status: SHIPPED | OUTPATIENT
Start: 2022-01-27 | End: 2022-01-28 | Stop reason: SDUPTHER

## 2022-01-27 RX ORDER — LISINOPRIL 5 MG/1
5 TABLET ORAL DAILY
Qty: 90 TABLET | Refills: 3 | Status: SHIPPED | OUTPATIENT
Start: 2022-01-27 | End: 2022-08-09 | Stop reason: SDUPTHER

## 2022-01-27 RX ORDER — GEMFIBROZIL 600 MG/1
600 TABLET, FILM COATED ORAL
Qty: 180 TABLET | Refills: 3 | Status: SHIPPED | OUTPATIENT
Start: 2022-01-27 | End: 2022-08-09 | Stop reason: SDUPTHER

## 2022-01-27 RX ORDER — OMEPRAZOLE 20 MG/1
20 CAPSULE, DELAYED RELEASE ORAL DAILY
Qty: 90 CAPSULE | Refills: 3 | Status: SHIPPED | OUTPATIENT
Start: 2022-01-27 | End: 2023-01-11

## 2022-01-27 RX ORDER — CITALOPRAM 20 MG/1
20 TABLET ORAL DAILY
Qty: 90 TABLET | Refills: 3 | Status: SHIPPED | OUTPATIENT
Start: 2022-01-27 | End: 2023-01-16

## 2022-01-27 NOTE — PROGRESS NOTES
Chief Complaint   Patient presents with   • Follow-up     Cardiac management   • Lab     Last labs about 6 months ago per PCP.    • Joint pain     Having little joint pain, feels could be Lipitor.   • Med Refill     Needs refills on  cardiac medications-90 day.     Subjective       Adair Chowdhury is a 66 y.o. male with HTN, hyperlipidemia, and palpitations (in the form of PVC) for which he wore a Holter monitor in 2001. PVCs were noted and he has been managed with beta blocker. Cardiac workup in 2014 showed moderate disease of the LAD which has been managed medically.  Repeat cardiac workup has been discussed due to length of time since last testing but he declined as he has remained asymptomatic and his co-pay is extremely high. January 2021 labs showed increase in LDL.  The dose of Lipitor was increased.     He returns today for regular follow up. Denies new or worsening cardiac symptoms. No CP, SOB, palpitations, fatigue. He has joint pain related to arthritis vs statin therapy. He requests refills and updated labs.        Cardiac History:    Past Surgical History:   Procedure Laterality Date   • CARDIAC CATHETERIZATION  03/10/2014    Cath- EF 60%, 30% LAD.   • CARDIAC CATHETERIZATION  11/10/2005    Cath- Normal including Renal   • CARDIOVASCULAR STRESS TEST  10/13/2005    Stress- 8 min 96% THR. /100 Small Ischemia in Lateral Wall   • CARDIOVASCULAR STRESS TEST  10/14/2009    Stress- 8 min, 97% THR. Negative, Increase Coreg to 12.5 mg BID   • CARDIOVASCULAR STRESS TEST  02/24/2014    Stress- 7 min, 93% THR, 172/90. R/O Inferior Ischemia, Lisinopril added   • CONVERTED (HISTORICAL) HOLTER  05/30/2001    Holter-Frequent PVC's   • ECHO - CONVERTED  08/29/2006    Echo- Normal (Dr. Resendez)   • ECHO - CONVERTED  10/14/2009    Echo-EF >60%, aortic root- 4.2cm   • ECHO - CONVERTED  02/24/2014    Echo-EF 60-65%     Current Outpatient Medications   Medication Sig Dispense Refill   • aspirin (aspirin) 81 MG EC tablet  Take 2 tablets by mouth Daily. 180 tablet 3   • carvedilol (COREG) 12.5 MG tablet Take 1 tablet by mouth 2 (Two) Times a Day With Meals. 180 tablet 3   • citalopram (CeleXA) 20 MG tablet Take 1 tablet by mouth Daily. 90 tablet 3   • gemfibrozil (LOPID) 600 MG tablet Take 1 tablet by mouth 2 (Two) Times a Day Before Meals. 180 tablet 3   • lisinopril (PRINIVIL,ZESTRIL) 5 MG tablet Take 1 tablet by mouth Daily. 90 tablet 3   • nitroglycerin (NITROSTAT) 0.4 MG SL tablet 1 under the tongue as needed for angina, may repeat q5mins for up three doses 25 tablet 0   • omeprazole (priLOSEC) 20 MG capsule Take 1 capsule by mouth Daily. 90 capsule 3   • sildenafil (REVATIO) 20 MG tablet 3 to 5 tablets as needed daily 30 minutes prior to intercourse 90 tablet 2   • atorvastatin (LIPITOR) 40 MG tablet Take 1 tablet by mouth Every Night. 90 tablet 3     No current facility-administered medications for this visit.     Patient has no known allergies.    Past Medical History:   Diagnosis Date   • Abnormal pulse oximetry 03/10/2011    Overnight pulse ox- Negative   • H pylori ulcer    • Heart murmur    • Heart murmur    • History of hernia surgery    • History of tonsillectomy    • Hyperlipidemia    • Hypertension    • LV dysfunction     mild     Social History     Socioeconomic History   • Marital status:    Tobacco Use   • Smoking status: Never Smoker   • Smokeless tobacco: Never Used   Vaping Use   • Vaping Use: Never used   Substance and Sexual Activity   • Alcohol use: No   • Drug use: No     Family History   Problem Relation Age of Onset   • Heart disease Mother    • Hypertension Mother    • Hyperlipidemia Mother    • Heart disease Father    • Heart attack Father    • Hyperlipidemia Father    • Hypertension Father    • Heart failure Father    • Diabetes Other    • Hypertension Other    • Hyperlipidemia Other    • No Known Problems Child      Review of Systems   Constitutional: Positive for weight gain (+7). Negative for  "decreased appetite and malaise/fatigue.   HENT: Negative.    Eyes: Negative for blurred vision.   Cardiovascular: Negative for chest pain, dyspnea on exertion, leg swelling, palpitations and syncope.   Respiratory: Negative for shortness of breath and sleep disturbances due to breathing.    Endocrine: Negative.    Hematologic/Lymphatic: Negative for bleeding problem. Does not bruise/bleed easily.   Skin: Negative.    Musculoskeletal: Positive for arthritis and stiffness. Negative for falls and myalgias.   Gastrointestinal: Negative for abdominal pain, heartburn and melena.   Genitourinary: Negative for hematuria.   Neurological: Negative for dizziness and light-headedness.   Psychiatric/Behavioral: Negative for altered mental status.   Allergic/Immunologic: Negative.       Objective     /80 (BP Location: Left arm)   Pulse 60   Temp 96.1 °F (35.6 °C)   Ht 185.4 cm (72.99\")   Wt 114 kg (251 lb 6.4 oz)   BMI 33.18 kg/m²     Vitals and nursing note reviewed.   Constitutional:       General: Not in acute distress.     Appearance: Well-developed. Not diaphoretic.   Eyes:      Pupils: Pupils are equal, round, and reactive to light.   HENT:      Head: Normocephalic.   Pulmonary:      Effort: Pulmonary effort is normal. No respiratory distress.      Breath sounds: Normal breath sounds.   Cardiovascular:      Normal rate. Regular rhythm.   Pulses:     Intact distal pulses.   Abdominal:      General: Bowel sounds are normal.      Palpations: Abdomen is soft.   Musculoskeletal: Normal range of motion.      Cervical back: Normal range of motion. Skin:     General: Skin is warm and dry.   Neurological:      Mental Status: Alert and oriented to person, place, and time.        Procedures          Problem List Items Addressed This Visit        Cardiac and Vasculature    Hyperlipemia    Relevant Medications    gemfibrozil (LOPID) 600 MG tablet    Other Relevant Orders    CBC & Differential (Completed)    Comprehensive " Metabolic Panel (Completed)    Lipid Panel (Completed)    HTN (hypertension)    Relevant Medications    lisinopril (PRINIVIL,ZESTRIL) 5 MG tablet    carvedilol (COREG) 12.5 MG tablet    Coronary artery disease involving native coronary artery of native heart without angina pectoris    Relevant Medications    carvedilol (COREG) 12.5 MG tablet       Gastrointestinal Abdominal     GERD without esophagitis    Relevant Medications    omeprazole (priLOSEC) 20 MG capsule    Other Relevant Orders    CBC & Differential (Completed)    Comprehensive Metabolic Panel (Completed)      Other Visit Diagnoses     Pain in joints of both feet    -  Primary    Relevant Orders    CBC & Differential (Completed)    Comprehensive Metabolic Panel (Completed)    Sedimentation Rate (Completed)    Uric Acid (Completed)    Essential hypertension        Relevant Medications    lisinopril (PRINIVIL,ZESTRIL) 5 MG tablet    carvedilol (COREG) 12.5 MG tablet    Other Relevant Orders    CBC & Differential (Completed)    Comprehensive Metabolic Panel (Completed)    TSH (Completed)    Myalgia        Relevant Orders    CBC & Differential (Completed)    Comprehensive Metabolic Panel (Completed)    CK (Completed)    Hyperglycemia        Relevant Orders    Comprehensive Metabolic Panel (Completed)    Hemoglobin A1c (Completed)    Vitamin D deficiency        Relevant Orders    Vitamin D 25 Hydroxy (Completed)    Nocturia        Relevant Orders    PSA DIAGNOSTIC (Completed)         1. CAD- non-obstructive, denies recurrent anginal symptoms. Declines repeat work up due to cost of testing and remaining asymptomatic. Continue aspirin, statin, bb. SL nitro as needed.     2. HTN- well controlled, continue lisinopril, carvedilol.  Limit Na. Weight loss.     3. Hypercholesterolemia- continue atorvastatin, gemfibrozil. Recheck lipids. Check CK and sed rate.     4. Gout symptoms- check uric acid level, sed rate.     Patient's Body mass index is 33.18 kg/m².  indicating that he is obese (BMI >30). Obesity-related health conditions include the following: obstructive sleep apnea, hypertension, coronary heart disease, diabetes mellitus, dyslipidemias and GERD. Obesity is worsening. BMI is is above average; BMI management plan is completed. We discussed portion control and increasing exercise..               Electronically signed by GLADYS Prado,  January 28, 2022 16:12 EST

## 2022-01-28 LAB
25(OH)D3 SERPL-MCNC: 28.2 NG/ML (ref 30–100)
PSA SERPL-MCNC: 0.64 NG/ML (ref 0–4)

## 2022-01-28 RX ORDER — ATORVASTATIN CALCIUM 40 MG/1
40 TABLET, FILM COATED ORAL NIGHTLY
Qty: 90 TABLET | Refills: 3 | Status: SHIPPED | OUTPATIENT
Start: 2022-01-28 | End: 2022-08-09 | Stop reason: SDUPTHER

## 2022-08-09 ENCOUNTER — OFFICE VISIT (OUTPATIENT)
Dept: CARDIOLOGY | Facility: CLINIC | Age: 67
End: 2022-08-09

## 2022-08-09 VITALS
HEART RATE: 66 BPM | BODY MASS INDEX: 33.02 KG/M2 | DIASTOLIC BLOOD PRESSURE: 76 MMHG | SYSTOLIC BLOOD PRESSURE: 124 MMHG | WEIGHT: 243.8 LBS | HEIGHT: 72 IN

## 2022-08-09 DIAGNOSIS — I10 PRIMARY HYPERTENSION: ICD-10-CM

## 2022-08-09 DIAGNOSIS — E66.9 OBESITY (BMI 30.0-34.9): ICD-10-CM

## 2022-08-09 DIAGNOSIS — R06.02 SHORTNESS OF BREATH: Primary | ICD-10-CM

## 2022-08-09 DIAGNOSIS — R73.03 PRE-DIABETES: ICD-10-CM

## 2022-08-09 DIAGNOSIS — E78.00 PURE HYPERCHOLESTEROLEMIA: ICD-10-CM

## 2022-08-09 DIAGNOSIS — I25.9 IHD (ISCHEMIC HEART DISEASE): ICD-10-CM

## 2022-08-09 DIAGNOSIS — N52.8 OTHER MALE ERECTILE DYSFUNCTION: ICD-10-CM

## 2022-08-09 DIAGNOSIS — I20.8 ANGINAL EQUIVALENT: ICD-10-CM

## 2022-08-09 DIAGNOSIS — I10 ESSENTIAL HYPERTENSION: ICD-10-CM

## 2022-08-09 PROCEDURE — 99214 OFFICE O/P EST MOD 30 MIN: CPT | Performed by: NURSE PRACTITIONER

## 2022-08-09 RX ORDER — ATORVASTATIN CALCIUM 40 MG/1
40 TABLET, FILM COATED ORAL NIGHTLY
Qty: 90 TABLET | Refills: 3 | Status: SHIPPED | OUTPATIENT
Start: 2022-08-09 | End: 2023-02-14 | Stop reason: SDUPTHER

## 2022-08-09 RX ORDER — LISINOPRIL 5 MG/1
5 TABLET ORAL DAILY
Qty: 90 TABLET | Refills: 3 | Status: SHIPPED | OUTPATIENT
Start: 2022-08-09 | End: 2023-02-14 | Stop reason: SDUPTHER

## 2022-08-09 RX ORDER — GEMFIBROZIL 600 MG/1
600 TABLET, FILM COATED ORAL
Qty: 180 TABLET | Refills: 3 | Status: SHIPPED | OUTPATIENT
Start: 2022-08-09 | End: 2023-02-14 | Stop reason: SDUPTHER

## 2022-08-09 RX ORDER — SILDENAFIL CITRATE 20 MG/1
TABLET ORAL
Qty: 90 TABLET | Refills: 2 | Status: SHIPPED | OUTPATIENT
Start: 2022-08-09 | End: 2023-02-14 | Stop reason: SDUPTHER

## 2022-08-09 RX ORDER — CARVEDILOL 12.5 MG/1
12.5 TABLET ORAL 2 TIMES DAILY WITH MEALS
Qty: 180 TABLET | Refills: 3 | Status: SHIPPED | OUTPATIENT
Start: 2022-08-09 | End: 2023-02-14 | Stop reason: SDUPTHER

## 2022-08-09 NOTE — ACP (ADVANCE CARE PLANNING)
Advance Care Planning   ACP discussion was held with the patient during this visit.  He states his wife does have POA.

## 2022-08-09 NOTE — PROGRESS NOTES
Chief Complaint   Patient presents with   • Follow-up     Pt is here for cardiac follow up.  He states he will get SOB with strenuous activity.  He denies CP, dizziness or palpitations.  Pt does take a daily ASA.   • Med Refill     Pt request 90 day refills to be sent to [facility] in [place].  His meds were verified by medication list that he provided.   • Lab Work     Pt's last labs were in January 2022.       Cardiac Complaints  dyspnea      Subjective   Adair Chowdhury is a 67 y.o. male with HTN, hyperlipidemia, and palpitations (in the form of PVC) for which he wore a Holter monitor in 2001. PVCs were noted and he has been managed with beta blocker. Cardiac workup in 2014 showed moderate disease of the LAD which has been managed medically.  Repeat cardiac workup has been discussed due to length of time since last testing but he declined as he has remained asymptomatic and his co-pay is extremely high. January 2021 labs showed increase in LDL.  The dose of Lipitor was increased.       Patient comes today for follow up and denies any new concerns. He continues to have SOA with exertion. No CP, palpitation, dizziness, or syncope noted. Labs done 01/2022 showed: , HDL 42, TRIG 84, BUN 11, Creatinine 1.14, Na 138, K 4.5, ALT 18, AST 22, A1C 6.0%, TSH 1.460, VIT D 28.2.  Refills requested.         Cardiac History  Past Surgical History:   Procedure Laterality Date   • CARDIAC CATHETERIZATION  03/10/2014    Cath- EF 60%, 30% LAD.   • CARDIAC CATHETERIZATION  11/10/2005    Cath- Normal including Renal   • CARDIOVASCULAR STRESS TEST  10/13/2005    Stress- 8 min 96% THR. /100 Small Ischemia in Lateral Wall   • CARDIOVASCULAR STRESS TEST  10/14/2009    Stress- 8 min, 97% THR. Negative, Increase Coreg to 12.5 mg BID   • CARDIOVASCULAR STRESS TEST  02/24/2014    Stress- 7 min, 93% THR, 172/90. R/O Inferior Ischemia, Lisinopril added   • CONVERTED (HISTORICAL) HOLTER  05/30/2001    Holter-Frequent PVC's   • ECHO -  CONVERTED  08/29/2006    Echo- Normal (Dr. Resendez)   • ECHO - CONVERTED  10/14/2009    Echo-EF >60%, aortic root- 4.2cm   • ECHO - CONVERTED  02/24/2014    Echo-EF 60-65%       Current Outpatient Medications   Medication Sig Dispense Refill   • aspirin (aspirin) 81 MG EC tablet Take 2 tablets by mouth Daily. 180 tablet 3   • atorvastatin (LIPITOR) 40 MG tablet Take 1 tablet by mouth Every Night. 90 tablet 3   • carvedilol (COREG) 12.5 MG tablet Take 1 tablet by mouth 2 (Two) Times a Day With Meals. 180 tablet 3   • citalopram (CeleXA) 20 MG tablet Take 1 tablet by mouth Daily. 90 tablet 3   • gemfibrozil (LOPID) 600 MG tablet Take 1 tablet by mouth 2 (Two) Times a Day Before Meals. 180 tablet 3   • lisinopril (PRINIVIL,ZESTRIL) 5 MG tablet Take 1 tablet by mouth Daily. 90 tablet 3   • nitroglycerin (NITROSTAT) 0.4 MG SL tablet 1 under the tongue as needed for angina, may repeat q5mins for up three doses 25 tablet 0   • omeprazole (priLOSEC) 20 MG capsule Take 1 capsule by mouth Daily. 90 capsule 3   • sildenafil (REVATIO) 20 MG tablet 3 to 5 tablets as needed daily 30 minutes prior to intercourse 90 tablet 2     No current facility-administered medications for this visit.       Patient has no known allergies.    Past Medical History:   Diagnosis Date   • Abnormal pulse oximetry 03/10/2011    Overnight pulse ox- Negative   • H pylori ulcer    • Heart murmur    • Heart murmur    • History of hernia surgery    • History of tonsillectomy    • Hyperlipidemia    • Hypertension    • LV dysfunction     mild       Social History     Socioeconomic History   • Marital status:    Tobacco Use   • Smoking status: Never Smoker   • Smokeless tobacco: Never Used   Vaping Use   • Vaping Use: Never used   Substance and Sexual Activity   • Alcohol use: No   • Drug use: No       Family History   Problem Relation Age of Onset   • Heart disease Mother    • Hypertension Mother    • Hyperlipidemia Mother    • Heart disease Father   "  • Heart attack Father    • Hyperlipidemia Father    • Hypertension Father    • Heart failure Father    • Diabetes Other    • Hypertension Other    • Hyperlipidemia Other    • No Known Problems Child        Review of Systems   Constitutional: Negative for malaise/fatigue and night sweats.   Cardiovascular: Positive for dyspnea on exertion. Negative for chest pain, claudication, irregular heartbeat, leg swelling, near-syncope, orthopnea, palpitations and syncope.   Respiratory: Positive for shortness of breath. Negative for cough and wheezing.    Musculoskeletal: Positive for stiffness. Negative for back pain and joint pain.   Gastrointestinal: Negative for anorexia, heartburn, melena, nausea and vomiting.   Genitourinary: Negative for dysuria, hematuria, hesitancy and nocturia.   Neurological: Negative for dizziness, headaches and light-headedness.   Psychiatric/Behavioral: Negative for depression and memory loss. The patient is not nervous/anxious.            Objective     /76 (BP Location: Left arm, Patient Position: Sitting)   Pulse 66   Ht 182.9 cm (72\")   Wt 111 kg (243 lb 12.8 oz)   BMI 33.07 kg/m²     Constitutional:       Appearance: Not in distress.   Eyes:      Pupils: Pupils are equal, round, and reactive to light.   HENT:      Nose: Nose normal.   Pulmonary:      Effort: Pulmonary effort is normal.      Breath sounds: Normal breath sounds.   Cardiovascular:      PMI at left midclavicular line. Normal rate. Regular rhythm.      Murmurs: There is a systolic murmur.   Abdominal:      Palpations: Abdomen is soft.   Musculoskeletal: Normal range of motion.      Cervical back: Normal range of motion and neck supple. Skin:     General: Skin is warm and dry.   Neurological:      Mental Status: Alert and oriented to person, place and time.         Procedures         Diagnoses and all orders for this visit:    1. Shortness of breath (Primary)  -     Stress Test With Myocardial Perfusion One Day; " Future  -     Adult Transthoracic Echo Complete W/ Cont if Necessary Per Protocol; Future    2. Anginal equivalent (HCC)  -     Stress Test With Myocardial Perfusion One Day; Future  -     Adult Transthoracic Echo Complete W/ Cont if Necessary Per Protocol; Future    3. IHD (ischemic heart disease)  -     Stress Test With Myocardial Perfusion One Day; Future  -     Adult Transthoracic Echo Complete W/ Cont if Necessary Per Protocol; Future  -     CBC (No Diff); Future  -     Comprehensive Metabolic Panel; Future  -     TSH; Future  -     Lipid Panel; Future    4. Primary hypertension  -     Stress Test With Myocardial Perfusion One Day; Future  -     Adult Transthoracic Echo Complete W/ Cont if Necessary Per Protocol; Future  -     CBC (No Diff); Future  -     Comprehensive Metabolic Panel; Future  -     TSH; Future  -     Lipid Panel; Future    5. Essential hypertension  -     carvedilol (COREG) 12.5 MG tablet; Take 1 tablet by mouth 2 (Two) Times a Day With Meals.  Dispense: 180 tablet; Refill: 3  -     lisinopril (PRINIVIL,ZESTRIL) 5 MG tablet; Take 1 tablet by mouth Daily.  Dispense: 90 tablet; Refill: 3  -     CBC (No Diff); Future  -     Comprehensive Metabolic Panel; Future  -     TSH; Future  -     Lipid Panel; Future    6. Pure hypercholesterolemia  -     gemfibrozil (LOPID) 600 MG tablet; Take 1 tablet by mouth 2 (Two) Times a Day Before Meals.  Dispense: 180 tablet; Refill: 3  -     CBC (No Diff); Future  -     Comprehensive Metabolic Panel; Future  -     TSH; Future  -     Lipid Panel; Future    7. Pre-diabetes  -     Hemoglobin A1c; Future    8. Other male erectile dysfunction  -     sildenafil (REVATIO) 20 MG tablet; 3 to 5 tablets as needed daily 30 minutes prior to intercourse  Dispense: 90 tablet; Refill: 2    9. Obesity (BMI 30.0-34.9)    Other orders  -     atorvastatin (LIPITOR) 40 MG tablet; Take 1 tablet by mouth Every Night.  Dispense: 90 tablet; Refill: 3             CAD with anginal  equivalent:  Cath in 2014 showed 30% LAD stenosis, no workup has been done since. Since it has been 8 years since last testing and anginal equivalent of SOA on exertion noted, repeat stress and echo advised to assess for ischemia, LV dysfunction, valve concerns. More recommendations to follow. ASA therapy continued.    HTN:  Blood pressure stable. No changes to current coreg or lisinopril therapy advised. Limited sodium advised.     Hyperlipidemia: On statin therapy with lipitor and also taking lopid therapy. Lab order provided to reassess FLP. Limited carb diet advised.     Refills per request.    BMI noted at 33.07, good cardiac diet with limited carb, caloric intake, and walking regimen advised.     6 month follow up advised or sooner if needed.           Problems Addressed this Visit        Cardiac and Vasculature    Hyperlipemia    Relevant Medications    atorvastatin (LIPITOR) 40 MG tablet    gemfibrozil (LOPID) 600 MG tablet    Other Relevant Orders    CBC (No Diff)    Comprehensive Metabolic Panel    TSH    Lipid Panel    HTN (hypertension)    Relevant Medications    carvedilol (COREG) 12.5 MG tablet    lisinopril (PRINIVIL,ZESTRIL) 5 MG tablet    Other Relevant Orders    Stress Test With Myocardial Perfusion One Day    Adult Transthoracic Echo Complete W/ Cont if Necessary Per Protocol    CBC (No Diff)    Comprehensive Metabolic Panel    TSH    Lipid Panel       Genitourinary and Reproductive     Other male erectile dysfunction    Relevant Medications    sildenafil (REVATIO) 20 MG tablet      Other Visit Diagnoses     Shortness of breath    -  Primary    Relevant Orders    Stress Test With Myocardial Perfusion One Day    Adult Transthoracic Echo Complete W/ Cont if Necessary Per Protocol    Anginal equivalent (HCC)        Relevant Medications    carvedilol (COREG) 12.5 MG tablet    sildenafil (REVATIO) 20 MG tablet    Other Relevant Orders    Stress Test With Myocardial Perfusion One Day    Adult  Transthoracic Echo Complete W/ Cont if Necessary Per Protocol    IHD (ischemic heart disease)        Relevant Medications    carvedilol (COREG) 12.5 MG tablet    sildenafil (REVATIO) 20 MG tablet    Other Relevant Orders    Stress Test With Myocardial Perfusion One Day    Adult Transthoracic Echo Complete W/ Cont if Necessary Per Protocol    CBC (No Diff)    Comprehensive Metabolic Panel    TSH    Lipid Panel    Essential hypertension        Relevant Medications    carvedilol (COREG) 12.5 MG tablet    lisinopril (PRINIVIL,ZESTRIL) 5 MG tablet    Other Relevant Orders    CBC (No Diff)    Comprehensive Metabolic Panel    TSH    Lipid Panel    Pre-diabetes        Relevant Orders    Hemoglobin A1c    Obesity (BMI 30.0-34.9)          Diagnoses       Codes Comments    Shortness of breath    -  Primary ICD-10-CM: R06.02  ICD-9-CM: 786.05     Anginal equivalent (HCC)     ICD-10-CM: I20.8  ICD-9-CM: 413.9     IHD (ischemic heart disease)     ICD-10-CM: I25.9  ICD-9-CM: 414.9     Primary hypertension     ICD-10-CM: I10  ICD-9-CM: 401.9     Essential hypertension     ICD-10-CM: I10  ICD-9-CM: 401.9     Pure hypercholesterolemia     ICD-10-CM: E78.00  ICD-9-CM: 272.0     Pre-diabetes     ICD-10-CM: R73.03  ICD-9-CM: 790.29     Other male erectile dysfunction     ICD-10-CM: N52.8  ICD-9-CM: 607.84     Obesity (BMI 30.0-34.9)     ICD-10-CM: E66.9  ICD-9-CM: 278.00                   Electronically signed by Fidelia Barnard, APRN August 9, 2022 09:43 EDT

## 2022-09-21 ENCOUNTER — LAB (OUTPATIENT)
Dept: LAB | Facility: HOSPITAL | Age: 67
End: 2022-09-21

## 2022-09-21 ENCOUNTER — HOSPITAL ENCOUNTER (OUTPATIENT)
Dept: CARDIOLOGY | Facility: HOSPITAL | Age: 67
Discharge: HOME | End: 2022-09-21

## 2022-09-21 VITALS — WEIGHT: 244.71 LBS | BODY MASS INDEX: 33.15 KG/M2 | HEIGHT: 72 IN

## 2022-09-21 DIAGNOSIS — I20.8 ANGINAL EQUIVALENT: ICD-10-CM

## 2022-09-21 DIAGNOSIS — I10 PRIMARY HYPERTENSION: ICD-10-CM

## 2022-09-21 DIAGNOSIS — R73.03 PRE-DIABETES: ICD-10-CM

## 2022-09-21 DIAGNOSIS — E78.00 PURE HYPERCHOLESTEROLEMIA: ICD-10-CM

## 2022-09-21 DIAGNOSIS — I25.9 IHD (ISCHEMIC HEART DISEASE): ICD-10-CM

## 2022-09-21 DIAGNOSIS — R06.02 SHORTNESS OF BREATH: ICD-10-CM

## 2022-09-21 DIAGNOSIS — I10 ESSENTIAL HYPERTENSION: ICD-10-CM

## 2022-09-21 LAB
ALBUMIN SERPL-MCNC: 4.98 G/DL (ref 3.5–5.2)
ALBUMIN/GLOB SERPL: 2.1 G/DL
ALP SERPL-CCNC: 89 U/L (ref 39–117)
ALT SERPL W P-5'-P-CCNC: 20 U/L (ref 1–41)
ANION GAP SERPL CALCULATED.3IONS-SCNC: 14.8 MMOL/L (ref 5–15)
AORTIC DIMENSIONLESS INDEX: 0.91 (DI)
AST SERPL-CCNC: 20 U/L (ref 1–40)
BH CV ECHO MEAS - ACS: 2.23 CM
BH CV ECHO MEAS - AO MAX PG: 3.2 MMHG
BH CV ECHO MEAS - AO MEAN PG: 1.47 MMHG
BH CV ECHO MEAS - AO ROOT DIAM: 3.8 CM
BH CV ECHO MEAS - AO V2 MAX: 89.8 CM/SEC
BH CV ECHO MEAS - AO V2 VTI: 18.5 CM
BH CV ECHO MEAS - EDV(CUBED): 68.4 ML
BH CV ECHO MEAS - EF(MOD-BP): 53 %
BH CV ECHO MEAS - ESV(CUBED): 26.1 ML
BH CV ECHO MEAS - FS: 27.4 %
BH CV ECHO MEAS - IVS/LVPW: 0.98 CM
BH CV ECHO MEAS - IVSD: 1.01 CM
BH CV ECHO MEAS - LA DIMENSION: 4 CM
BH CV ECHO MEAS - LAT PEAK E' VEL: 11 CM/SEC
BH CV ECHO MEAS - LV MASS(C)D: 134.7 GRAMS
BH CV ECHO MEAS - LV MAX PG: 2.7 MMHG
BH CV ECHO MEAS - LV MEAN PG: 1.32 MMHG
BH CV ECHO MEAS - LV V1 MAX: 82.3 CM/SEC
BH CV ECHO MEAS - LV V1 VTI: 21 CM
BH CV ECHO MEAS - LVIDD: 4.1 CM
BH CV ECHO MEAS - LVIDS: 3 CM
BH CV ECHO MEAS - LVPWD: 1.03 CM
BH CV ECHO MEAS - MED PEAK E' VEL: 9.4 CM/SEC
BH CV ECHO MEAS - MV A MAX VEL: 51.8 CM/SEC
BH CV ECHO MEAS - MV DEC SLOPE: 279.6 CM/SEC2
BH CV ECHO MEAS - MV DEC TIME: 0.18 MSEC
BH CV ECHO MEAS - MV E MAX VEL: 45.1 CM/SEC
BH CV ECHO MEAS - MV E/A: 0.87
BH CV ECHO MEAS - MV MAX PG: 2.6 MMHG
BH CV ECHO MEAS - MV MEAN PG: 1.03 MMHG
BH CV ECHO MEAS - MV P1/2T: 71.9 MSEC
BH CV ECHO MEAS - MV V2 VTI: 23.7 CM
BH CV ECHO MEAS - MVA(P1/2T): 3.1 CM2
BH CV ECHO MEAS - PA V2 MAX: 78.2 CM/SEC
BH CV ECHO MEAS - RV MAX PG: 1.46 MMHG
BH CV ECHO MEAS - RV V1 MAX: 60.4 CM/SEC
BH CV ECHO MEAS - RV V1 VTI: 16 CM
BH CV ECHO MEAS - RVDD: 3.5 CM
BH CV ECHO MEAS - TAPSE (>1.6): 2.11 CM
BH CV ECHO MEASUREMENTS AVERAGE E/E' RATIO: 4.42
BH CV REST NUCLEAR ISOTOPE DOSE: 10 MCI
BH CV STRESS NUCLEAR ISOTOPE DOSE: 30 MCI
BH CV STRESS RECOVERY BP: NORMAL MMHG
BH CV STRESS RECOVERY HR: 78 BPM
BH CV XLRA - TDI S': 15.6 CM/SEC
BILIRUB SERPL-MCNC: 0.4 MG/DL (ref 0–1.2)
BUN SERPL-MCNC: 12 MG/DL (ref 8–23)
BUN/CREAT SERPL: 11.8 (ref 7–25)
CALCIUM SPEC-SCNC: 9.7 MG/DL (ref 8.6–10.5)
CHLORIDE SERPL-SCNC: 102 MMOL/L (ref 98–107)
CHOLEST SERPL-MCNC: 148 MG/DL (ref 0–200)
CO2 SERPL-SCNC: 21.2 MMOL/L (ref 22–29)
CREAT SERPL-MCNC: 1.02 MG/DL (ref 0.76–1.27)
DEPRECATED RDW RBC AUTO: 45.1 FL (ref 37–54)
EGFRCR SERPLBLD CKD-EPI 2021: 81.1 ML/MIN/1.73
ERYTHROCYTE [DISTWIDTH] IN BLOOD BY AUTOMATED COUNT: 12.4 % (ref 12.3–15.4)
GLOBULIN UR ELPH-MCNC: 2.3 GM/DL
GLUCOSE SERPL-MCNC: 102 MG/DL (ref 65–99)
HBA1C MFR BLD: 5.7 % (ref 4.8–5.6)
HCT VFR BLD AUTO: 44.6 % (ref 37.5–51)
HDLC SERPL-MCNC: 40 MG/DL (ref 40–60)
HGB BLD-MCNC: 14.4 G/DL (ref 13–17.7)
LDLC SERPL CALC-MCNC: 88 MG/DL (ref 0–100)
LDLC/HDLC SERPL: 2.15 {RATIO}
LV EF NUC BP: 55 %
MAXIMAL PREDICTED HEART RATE: 154 BPM
MAXIMAL PREDICTED HEART RATE: 154 BPM
MCH RBC QN AUTO: 31.8 PG (ref 26.6–33)
MCHC RBC AUTO-ENTMCNC: 32.3 G/DL (ref 31.5–35.7)
MCV RBC AUTO: 98.5 FL (ref 79–97)
PERCENT MAX PREDICTED HR: 114.29 %
PLATELET # BLD AUTO: 245 10*3/MM3 (ref 140–450)
PMV BLD AUTO: 11.2 FL (ref 6–12)
POTASSIUM SERPL-SCNC: 4.5 MMOL/L (ref 3.5–5.2)
PROT SERPL-MCNC: 7.3 G/DL (ref 6–8.5)
RBC # BLD AUTO: 4.53 10*6/MM3 (ref 4.14–5.8)
SINUS: 3.8 CM
SODIUM SERPL-SCNC: 138 MMOL/L (ref 136–145)
STRESS BASELINE BP: NORMAL MMHG
STRESS BASELINE HR: 70 BPM
STRESS PERCENT HR: 134 %
STRESS POST ESTIMATED WORKLOAD: 7 METS
STRESS POST EXERCISE DUR MIN: 5 MIN
STRESS POST EXERCISE DUR SEC: 20 SEC
STRESS POST PEAK BP: NORMAL MMHG
STRESS POST PEAK HR: 176 BPM
STRESS TARGET HR: 131 BPM
STRESS TARGET HR: 131 BPM
TRIGL SERPL-MCNC: 111 MG/DL (ref 0–150)
TSH SERPL DL<=0.05 MIU/L-ACNC: 1.72 UIU/ML (ref 0.27–4.2)
VLDLC SERPL-MCNC: 20 MG/DL (ref 5–40)
WBC NRBC COR # BLD: 5.37 10*3/MM3 (ref 3.4–10.8)

## 2022-09-21 PROCEDURE — 93306 TTE W/DOPPLER COMPLETE: CPT

## 2022-09-21 PROCEDURE — 93306 TTE W/DOPPLER COMPLETE: CPT | Performed by: INTERNAL MEDICINE

## 2022-09-21 PROCEDURE — 80061 LIPID PANEL: CPT | Performed by: NURSE PRACTITIONER

## 2022-09-21 PROCEDURE — 0 TECHNETIUM SESTAMIBI: Performed by: INTERNAL MEDICINE

## 2022-09-21 PROCEDURE — 85027 COMPLETE CBC AUTOMATED: CPT | Performed by: NURSE PRACTITIONER

## 2022-09-21 PROCEDURE — 83036 HEMOGLOBIN GLYCOSYLATED A1C: CPT | Performed by: NURSE PRACTITIONER

## 2022-09-21 PROCEDURE — A9500 TC99M SESTAMIBI: HCPCS | Performed by: INTERNAL MEDICINE

## 2022-09-21 PROCEDURE — 93017 CV STRESS TEST TRACING ONLY: CPT

## 2022-09-21 PROCEDURE — 78452 HT MUSCLE IMAGE SPECT MULT: CPT

## 2022-09-21 PROCEDURE — 80053 COMPREHEN METABOLIC PANEL: CPT | Performed by: NURSE PRACTITIONER

## 2022-09-21 PROCEDURE — 93018 CV STRESS TEST I&R ONLY: CPT | Performed by: INTERNAL MEDICINE

## 2022-09-21 PROCEDURE — 84443 ASSAY THYROID STIM HORMONE: CPT | Performed by: NURSE PRACTITIONER

## 2022-09-21 PROCEDURE — 78452 HT MUSCLE IMAGE SPECT MULT: CPT | Performed by: INTERNAL MEDICINE

## 2022-09-21 RX ADMIN — TECHNETIUM TC 99M SESTAMIBI 1 DOSE: 1 INJECTION INTRAVENOUS at 10:26

## 2022-09-21 RX ADMIN — TECHNETIUM TC 99M SESTAMIBI 1 DOSE: 1 INJECTION INTRAVENOUS at 09:00

## 2023-01-10 DIAGNOSIS — K21.9 GERD WITHOUT ESOPHAGITIS: ICD-10-CM

## 2023-01-11 RX ORDER — OMEPRAZOLE 20 MG/1
CAPSULE, DELAYED RELEASE ORAL
Qty: 90 CAPSULE | Refills: 0 | Status: SHIPPED | OUTPATIENT
Start: 2023-01-11

## 2023-01-16 RX ORDER — CITALOPRAM 20 MG/1
TABLET ORAL
Qty: 90 TABLET | Refills: 0 | Status: SHIPPED | OUTPATIENT
Start: 2023-01-16

## 2023-02-14 ENCOUNTER — OFFICE VISIT (OUTPATIENT)
Dept: CARDIOLOGY | Facility: CLINIC | Age: 68
End: 2023-02-14
Payer: MEDICARE

## 2023-02-14 VITALS
SYSTOLIC BLOOD PRESSURE: 94 MMHG | DIASTOLIC BLOOD PRESSURE: 62 MMHG | BODY MASS INDEX: 35 KG/M2 | WEIGHT: 250 LBS | HEIGHT: 71 IN | HEART RATE: 88 BPM

## 2023-02-14 DIAGNOSIS — R00.2 PALPITATION: Primary | ICD-10-CM

## 2023-02-14 DIAGNOSIS — M25.59 PAIN IN OTHER JOINT: ICD-10-CM

## 2023-02-14 DIAGNOSIS — E78.00 PURE HYPERCHOLESTEROLEMIA: ICD-10-CM

## 2023-02-14 DIAGNOSIS — R06.02 SHORTNESS OF BREATH: ICD-10-CM

## 2023-02-14 DIAGNOSIS — E66.9 OBESITY (BMI 30.0-34.9): ICD-10-CM

## 2023-02-14 DIAGNOSIS — I10 ESSENTIAL HYPERTENSION: ICD-10-CM

## 2023-02-14 DIAGNOSIS — N52.8 OTHER MALE ERECTILE DYSFUNCTION: ICD-10-CM

## 2023-02-14 DIAGNOSIS — R73.9 HYPERGLYCEMIA: ICD-10-CM

## 2023-02-14 PROCEDURE — 99214 OFFICE O/P EST MOD 30 MIN: CPT | Performed by: NURSE PRACTITIONER

## 2023-02-14 RX ORDER — SILDENAFIL CITRATE 20 MG/1
TABLET ORAL
Qty: 90 TABLET | Refills: 2 | Status: SHIPPED | OUTPATIENT
Start: 2023-02-14

## 2023-02-14 RX ORDER — CARVEDILOL 12.5 MG/1
12.5 TABLET ORAL 2 TIMES DAILY WITH MEALS
Qty: 180 TABLET | Refills: 3 | Status: SHIPPED | OUTPATIENT
Start: 2023-02-14

## 2023-02-14 RX ORDER — GEMFIBROZIL 600 MG/1
600 TABLET, FILM COATED ORAL
Qty: 180 TABLET | Refills: 3 | Status: SHIPPED | OUTPATIENT
Start: 2023-02-14

## 2023-02-14 RX ORDER — ATORVASTATIN CALCIUM 40 MG/1
40 TABLET, FILM COATED ORAL NIGHTLY
Qty: 90 TABLET | Refills: 3 | Status: SHIPPED | OUTPATIENT
Start: 2023-02-14

## 2023-02-14 RX ORDER — LISINOPRIL 5 MG/1
5 TABLET ORAL DAILY
Qty: 90 TABLET | Refills: 3 | Status: SHIPPED | OUTPATIENT
Start: 2023-02-14

## 2023-02-14 NOTE — PROGRESS NOTES
Chief Complaint   Patient presents with   • Follow-up     Pt is here for cardiac follow up.  Pt states he does get SOB with strenuous activity.  Pt denies CP, dizziness or palpitations.  Pt does take a daily ASA.     • Med Refill     Pt request 90 day refills to be sent to [facility].  Medications were verified by med list     • Lab Work     Pt states their last labs were 9/21/22.         Cardiac Complaints  dyspnea      Subjective   Adair Chowdhury is a 67 y.o. male with HTN, hyperlipidemia, and palpitations (in the form of PVC) for which he wore a Holter monitor in 2001. PVCs were noted and he has been managed with beta blocker. Cardiac workup in 2014 showed moderate disease of the LAD which has been managed medically.  Repeat cardiac workup has been discussed due to length of time since last testing but he declined as he has remained asymptomatic and his co-pay is extremely high. January 2021 labs showed increase in LDL.  The dose of Lipitor was increased. Last visit, SOA with exertion noted. Repeat workup was advised. Stress done 9/2022 showed normal LV function, HTN response, and diphragmatic attenuation,home meds continued.    He returns today for follow up and continues to have SOA noted with overexertion. Patient reports with everyday activity he does well and is not limited. CP, palpitations, dizziness, and syncope are denied. Labs done 9/2022 and showed: AIC 5.7%, HH 14.4/44.6, , Na 138, K 4.5, ALT 20, AST 20, GFR 81, TSH 1.720, LDL 88, HDL 40, TRIG 111. Refills requested.          Cardiac History  Past Surgical History:   Procedure Laterality Date   • CARDIAC CATHETERIZATION  03/10/2014    Cath- EF 60%, 30% LAD.   • CARDIAC CATHETERIZATION  11/10/2005    Cath- Normal including Renal   • CARDIOVASCULAR STRESS TEST  10/13/2005    Stress- 8 min 96% THR. /100 Small Ischemia in Lateral Wall   • CARDIOVASCULAR STRESS TEST  10/14/2009    Stress- 8 min, 97% THR. Negative, Increase Coreg to 12.5 mg BID    • CARDIOVASCULAR STRESS TEST  02/24/2014    Stress- 7 min, 93% THR, 172/90. R/O Inferior Ischemia, Lisinopril added   • CARDIOVASCULAR STRESS TEST  09/21/2022    5 Min.20 Sec.7.0 METS. 85% THR. 176/69. EF 55%. Diaphramatic Attenuation   • CONVERTED (HISTORICAL) HOLTER  05/30/2001    Holter-Frequent PVC's   • ECHO - CONVERTED  08/29/2006    Echo- Normal (Dr. Resendez)   • ECHO - CONVERTED  10/14/2009    Echo-EF >60%, aortic root- 4.2cm   • ECHO - CONVERTED  02/24/2014    Echo-EF 60-65%   • ECHO - CONVERTED  09/21/2022    TLS.EF 55%. LA- 4.0. Trace-Mild MR. AO- 3.8       Current Outpatient Medications   Medication Sig Dispense Refill   • aspirin (aspirin) 81 MG EC tablet Take 2 tablets by mouth Daily. 180 tablet 3   • atorvastatin (LIPITOR) 40 MG tablet Take 1 tablet by mouth Every Night. 90 tablet 3   • carvedilol (COREG) 12.5 MG tablet Take 1 tablet by mouth 2 (Two) Times a Day With Meals. 180 tablet 3   • citalopram (CeleXA) 20 MG tablet Take 1 tablet by mouth once daily 90 tablet 0   • gemfibrozil (LOPID) 600 MG tablet Take 1 tablet by mouth 2 (Two) Times a Day Before Meals. 180 tablet 3   • lisinopril (PRINIVIL,ZESTRIL) 5 MG tablet Take 1 tablet by mouth Daily. 90 tablet 3   • nitroglycerin (NITROSTAT) 0.4 MG SL tablet 1 under the tongue as needed for angina, may repeat q5mins for up three doses 25 tablet 0   • omeprazole (priLOSEC) 20 MG capsule Take 1 capsule by mouth once daily 90 capsule 0   • sildenafil (REVATIO) 20 MG tablet 3 to 5 tablets as needed daily 30 minutes prior to intercourse 90 tablet 2     No current facility-administered medications for this visit.       Patient has no known allergies.    Past Medical History:   Diagnosis Date   • Abnormal pulse oximetry 03/10/2011    Overnight pulse ox- Negative   • H pylori ulcer    • Heart murmur    • Heart murmur    • History of hernia surgery    • History of tonsillectomy    • Hyperlipidemia    • Hypertension    • LV dysfunction     mild       Social  "History     Socioeconomic History   • Marital status:    Tobacco Use   • Smoking status: Never   • Smokeless tobacco: Never   Vaping Use   • Vaping Use: Never used   Substance and Sexual Activity   • Alcohol use: No   • Drug use: No       Family History   Problem Relation Age of Onset   • Heart disease Mother    • Hypertension Mother    • Hyperlipidemia Mother    • Heart disease Father    • Heart attack Father    • Hyperlipidemia Father    • Hypertension Father    • Heart failure Father    • Diabetes Other    • Hypertension Other    • Hyperlipidemia Other    • No Known Problems Child        Review of Systems   Constitutional: Negative for malaise/fatigue and night sweats.   Cardiovascular: Positive for dyspnea on exertion. Negative for chest pain, claudication, irregular heartbeat, leg swelling, near-syncope, orthopnea, palpitations and syncope.   Respiratory: Positive for shortness of breath. Negative for cough and wheezing.    Musculoskeletal: Positive for stiffness. Negative for back pain and joint pain.   Gastrointestinal: Negative for anorexia, heartburn, nausea and vomiting.   Genitourinary: Negative for dysuria, hematuria, hesitancy and nocturia.   Neurological: Negative for dizziness, headaches and light-headedness.   Psychiatric/Behavioral: Negative for depression and memory loss. The patient is not nervous/anxious.            Objective     BP 94/62 (BP Location: Left arm, Patient Position: Sitting)   Pulse 88   Ht 180.3 cm (71\")   Wt 113 kg (250 lb)   BMI 34.87 kg/m²     Constitutional:       Appearance: Not in distress.   Eyes:      Pupils: Pupils are equal, round, and reactive to light.   HENT:      Nose: Nose normal.   Pulmonary:      Effort: Pulmonary effort is normal.      Breath sounds: Normal breath sounds.   Cardiovascular:      PMI at left midclavicular line. Normal rate. Regular rhythm.      Murmurs: There is a systolic murmur.   Abdominal:      Palpations: Abdomen is soft. "   Musculoskeletal: Normal range of motion.      Cervical back: Normal range of motion and neck supple. Skin:     General: Skin is warm and dry.   Neurological:      Mental Status: Alert and oriented to person, place and time.         Procedures         Diagnoses and all orders for this visit:    1. Palpitation (Primary)  -     CK; Future  -     CBC (No Diff); Future  -     Comprehensive Metabolic Panel; Future  -     Lipid Panel; Future  -     TSH; Future    2. Essential hypertension  -     carvedilol (COREG) 12.5 MG tablet; Take 1 tablet by mouth 2 (Two) Times a Day With Meals.  Dispense: 180 tablet; Refill: 3  -     lisinopril (PRINIVIL,ZESTRIL) 5 MG tablet; Take 1 tablet by mouth Daily.  Dispense: 90 tablet; Refill: 3  -     CBC (No Diff); Future  -     Comprehensive Metabolic Panel; Future  -     Lipid Panel; Future  -     TSH; Future    3. Pure hypercholesterolemia  -     gemfibrozil (LOPID) 600 MG tablet; Take 1 tablet by mouth 2 (Two) Times a Day Before Meals.  Dispense: 180 tablet; Refill: 3  -     CK; Future  -     CBC (No Diff); Future  -     Comprehensive Metabolic Panel; Future  -     Lipid Panel; Future  -     TSH; Future    4. Shortness of breath    5. Hyperglycemia  -     Hemoglobin A1c; Future    6. Other male erectile dysfunction  -     sildenafil (REVATIO) 20 MG tablet; 3 to 5 tablets as needed daily 30 minutes prior to intercourse  Dispense: 90 tablet; Refill: 2    7. Pain in other joint  -     CK; Future    8. Obesity (BMI 30.0-34.9)    Other orders  -     atorvastatin (LIPITOR) 40 MG tablet; Take 1 tablet by mouth Every Night.  Dispense: 90 tablet; Refill: 3             CAD: ASA continued. Most recent workup showed no ischemia, with good LV function, current continued. No repeat workup advised.    HTN:  Blood pressure stable, low normal. Log encouraged. For now, current lisinopril and coreg continued. If BP should remain below 100 SBP, may decrease lisinopril to 1/2 tablet per  day.     Hyperlipidemia: On statin therapy with lipitor and also taking lopid therapy. Lab order provided to reassess FLP. Limited carb diet advised.      Refills per request.     BMI noted at 34.87, good cardiac diet with limited carb, caloric intake, and walking regimen advised.      6 month follow up advised or sooner if needed.          Problems Addressed this Visit        Cardiac and Vasculature    Hyperlipemia    Relevant Medications    atorvastatin (LIPITOR) 40 MG tablet    gemfibrozil (LOPID) 600 MG tablet    Other Relevant Orders    CK    CBC (No Diff)    Comprehensive Metabolic Panel    Lipid Panel    TSH    Palpitation - Primary    Relevant Orders    CK    CBC (No Diff)    Comprehensive Metabolic Panel    Lipid Panel    TSH       Genitourinary and Reproductive     Other male erectile dysfunction    Relevant Medications    sildenafil (REVATIO) 20 MG tablet   Other Visit Diagnoses     Essential hypertension        Relevant Medications    carvedilol (COREG) 12.5 MG tablet    lisinopril (PRINIVIL,ZESTRIL) 5 MG tablet    Other Relevant Orders    CBC (No Diff)    Comprehensive Metabolic Panel    Lipid Panel    TSH    Shortness of breath        Hyperglycemia        Relevant Orders    Hemoglobin A1c    Pain in other joint        Relevant Orders    CK    Obesity (BMI 30.0-34.9)          Diagnoses       Codes Comments    Palpitation    -  Primary ICD-10-CM: R00.2  ICD-9-CM: 785.1     Essential hypertension     ICD-10-CM: I10  ICD-9-CM: 401.9     Pure hypercholesterolemia     ICD-10-CM: E78.00  ICD-9-CM: 272.0     Shortness of breath     ICD-10-CM: R06.02  ICD-9-CM: 786.05     Hyperglycemia     ICD-10-CM: R73.9  ICD-9-CM: 790.29     Other male erectile dysfunction     ICD-10-CM: N52.8  ICD-9-CM: 607.84     Pain in other joint     ICD-10-CM: M25.59  ICD-9-CM: 719.48     Obesity (BMI 30.0-34.9)     ICD-10-CM: E66.9  ICD-9-CM: 278.00                        Electronically signed by GLADYS Akers February 14,  2023 11:02 EST

## 2023-02-14 NOTE — ACP (ADVANCE CARE PLANNING)
Advance Care Planning   ACP discussion was held with the patient during this visit. Pt states he has a POA

## 2023-02-15 ENCOUNTER — LAB (OUTPATIENT)
Dept: LAB | Facility: HOSPITAL | Age: 68
End: 2023-02-15
Payer: MEDICARE

## 2023-02-15 DIAGNOSIS — M25.59 PAIN IN OTHER JOINT: ICD-10-CM

## 2023-02-15 DIAGNOSIS — R00.2 PALPITATION: ICD-10-CM

## 2023-02-15 DIAGNOSIS — R73.9 HYPERGLYCEMIA: ICD-10-CM

## 2023-02-15 DIAGNOSIS — I10 ESSENTIAL HYPERTENSION: ICD-10-CM

## 2023-02-15 DIAGNOSIS — E78.00 PURE HYPERCHOLESTEROLEMIA: ICD-10-CM

## 2023-02-15 LAB
ALBUMIN SERPL-MCNC: 4.6 G/DL (ref 3.5–5.2)
ALBUMIN/GLOB SERPL: 1.9 G/DL
ALP SERPL-CCNC: 103 U/L (ref 39–117)
ALT SERPL W P-5'-P-CCNC: 21 U/L (ref 1–41)
ANION GAP SERPL CALCULATED.3IONS-SCNC: 12.2 MMOL/L (ref 5–15)
AST SERPL-CCNC: 22 U/L (ref 1–40)
BILIRUB SERPL-MCNC: 0.6 MG/DL (ref 0–1.2)
BUN SERPL-MCNC: 12 MG/DL (ref 8–23)
BUN/CREAT SERPL: 10.9 (ref 7–25)
CALCIUM SPEC-SCNC: 9.3 MG/DL (ref 8.6–10.5)
CHLORIDE SERPL-SCNC: 100 MMOL/L (ref 98–107)
CHOLEST SERPL-MCNC: 140 MG/DL (ref 0–200)
CK SERPL-CCNC: 176 U/L (ref 20–200)
CO2 SERPL-SCNC: 22.8 MMOL/L (ref 22–29)
CREAT SERPL-MCNC: 1.1 MG/DL (ref 0.76–1.27)
DEPRECATED RDW RBC AUTO: 45.6 FL (ref 37–54)
EGFRCR SERPLBLD CKD-EPI 2021: 74 ML/MIN/1.73
ERYTHROCYTE [DISTWIDTH] IN BLOOD BY AUTOMATED COUNT: 12.9 % (ref 12.3–15.4)
GLOBULIN UR ELPH-MCNC: 2.4 GM/DL
GLUCOSE SERPL-MCNC: 113 MG/DL (ref 65–99)
HBA1C MFR BLD: 5.8 % (ref 4.8–5.6)
HCT VFR BLD AUTO: 44.1 % (ref 37.5–51)
HDLC SERPL-MCNC: 35 MG/DL (ref 40–60)
HGB BLD-MCNC: 14.6 G/DL (ref 13–17.7)
LDLC SERPL CALC-MCNC: 83 MG/DL (ref 0–100)
LDLC/HDLC SERPL: 2.33 {RATIO}
MCH RBC QN AUTO: 32.3 PG (ref 26.6–33)
MCHC RBC AUTO-ENTMCNC: 33.1 G/DL (ref 31.5–35.7)
MCV RBC AUTO: 97.6 FL (ref 79–97)
PLATELET # BLD AUTO: 205 10*3/MM3 (ref 140–450)
PMV BLD AUTO: 11.1 FL (ref 6–12)
POTASSIUM SERPL-SCNC: 4.2 MMOL/L (ref 3.5–5.2)
PROT SERPL-MCNC: 7 G/DL (ref 6–8.5)
RBC # BLD AUTO: 4.52 10*6/MM3 (ref 4.14–5.8)
SODIUM SERPL-SCNC: 135 MMOL/L (ref 136–145)
TRIGL SERPL-MCNC: 118 MG/DL (ref 0–150)
TSH SERPL DL<=0.05 MIU/L-ACNC: 1.09 UIU/ML (ref 0.27–4.2)
VLDLC SERPL-MCNC: 22 MG/DL (ref 5–40)
WBC NRBC COR # BLD: 5.1 10*3/MM3 (ref 3.4–10.8)

## 2023-02-15 PROCEDURE — 82550 ASSAY OF CK (CPK): CPT | Performed by: NURSE PRACTITIONER

## 2023-02-15 PROCEDURE — 84443 ASSAY THYROID STIM HORMONE: CPT | Performed by: NURSE PRACTITIONER

## 2023-02-15 PROCEDURE — 80061 LIPID PANEL: CPT | Performed by: NURSE PRACTITIONER

## 2023-02-15 PROCEDURE — 85027 COMPLETE CBC AUTOMATED: CPT | Performed by: NURSE PRACTITIONER

## 2023-02-15 PROCEDURE — 80053 COMPREHEN METABOLIC PANEL: CPT | Performed by: NURSE PRACTITIONER

## 2023-02-15 PROCEDURE — 83036 HEMOGLOBIN GLYCOSYLATED A1C: CPT | Performed by: NURSE PRACTITIONER

## 2023-04-10 DIAGNOSIS — K21.9 GERD WITHOUT ESOPHAGITIS: ICD-10-CM

## 2023-04-10 RX ORDER — OMEPRAZOLE 20 MG/1
CAPSULE, DELAYED RELEASE ORAL
Qty: 90 CAPSULE | Refills: 0 | Status: SHIPPED | OUTPATIENT
Start: 2023-04-10

## 2023-04-10 RX ORDER — CITALOPRAM 20 MG/1
TABLET ORAL
Qty: 90 TABLET | Refills: 0 | Status: SHIPPED | OUTPATIENT
Start: 2023-04-10

## 2023-08-15 ENCOUNTER — OFFICE VISIT (OUTPATIENT)
Dept: CARDIOLOGY | Facility: CLINIC | Age: 68
End: 2023-08-15
Payer: MEDICARE

## 2023-08-15 ENCOUNTER — LAB (OUTPATIENT)
Dept: LAB | Facility: HOSPITAL | Age: 68
End: 2023-08-15
Payer: MEDICARE

## 2023-08-15 VITALS
WEIGHT: 246 LBS | HEART RATE: 68 BPM | DIASTOLIC BLOOD PRESSURE: 70 MMHG | BODY MASS INDEX: 34.44 KG/M2 | SYSTOLIC BLOOD PRESSURE: 108 MMHG | HEIGHT: 71 IN

## 2023-08-15 DIAGNOSIS — R00.2 PALPITATION: ICD-10-CM

## 2023-08-15 DIAGNOSIS — E66.9 OBESITY (BMI 30.0-34.9): ICD-10-CM

## 2023-08-15 DIAGNOSIS — I10 PRIMARY HYPERTENSION: ICD-10-CM

## 2023-08-15 DIAGNOSIS — I25.10 CORONARY ARTERY DISEASE INVOLVING NATIVE CORONARY ARTERY OF NATIVE HEART WITHOUT ANGINA PECTORIS: Primary | ICD-10-CM

## 2023-08-15 DIAGNOSIS — E78.00 PURE HYPERCHOLESTEROLEMIA: ICD-10-CM

## 2023-08-15 DIAGNOSIS — I10 ESSENTIAL HYPERTENSION: ICD-10-CM

## 2023-08-15 DIAGNOSIS — N52.8 OTHER MALE ERECTILE DYSFUNCTION: ICD-10-CM

## 2023-08-15 LAB
ALBUMIN SERPL-MCNC: 4.9 G/DL (ref 3.5–5.2)
ALBUMIN/GLOB SERPL: 2.2 G/DL
ALP SERPL-CCNC: 97 U/L (ref 39–117)
ALT SERPL W P-5'-P-CCNC: 18 U/L (ref 1–41)
ANION GAP SERPL CALCULATED.3IONS-SCNC: 15.7 MMOL/L (ref 5–15)
AST SERPL-CCNC: 19 U/L (ref 1–40)
BILIRUB SERPL-MCNC: 0.3 MG/DL (ref 0–1.2)
BUN SERPL-MCNC: 13 MG/DL (ref 8–23)
BUN/CREAT SERPL: 11.9 (ref 7–25)
CALCIUM SPEC-SCNC: 9.6 MG/DL (ref 8.6–10.5)
CHLORIDE SERPL-SCNC: 100 MMOL/L (ref 98–107)
CHOLEST SERPL-MCNC: 150 MG/DL (ref 0–200)
CO2 SERPL-SCNC: 21.3 MMOL/L (ref 22–29)
CREAT SERPL-MCNC: 1.09 MG/DL (ref 0.76–1.27)
DEPRECATED RDW RBC AUTO: 45.2 FL (ref 37–54)
EGFRCR SERPLBLD CKD-EPI 2021: 74.4 ML/MIN/1.73
ERYTHROCYTE [DISTWIDTH] IN BLOOD BY AUTOMATED COUNT: 12.5 % (ref 12.3–15.4)
GLOBULIN UR ELPH-MCNC: 2.2 GM/DL
GLUCOSE SERPL-MCNC: 109 MG/DL (ref 65–99)
HCT VFR BLD AUTO: 44.5 % (ref 37.5–51)
HDLC SERPL-MCNC: 41 MG/DL (ref 40–60)
HGB BLD-MCNC: 14.1 G/DL (ref 13–17.7)
LDLC SERPL CALC-MCNC: 92 MG/DL (ref 0–100)
LDLC/HDLC SERPL: 2.23 {RATIO}
MCH RBC QN AUTO: 31.1 PG (ref 26.6–33)
MCHC RBC AUTO-ENTMCNC: 31.7 G/DL (ref 31.5–35.7)
MCV RBC AUTO: 98.2 FL (ref 79–97)
PLATELET # BLD AUTO: 233 10*3/MM3 (ref 140–450)
PMV BLD AUTO: 11.1 FL (ref 6–12)
POTASSIUM SERPL-SCNC: 4.6 MMOL/L (ref 3.5–5.2)
PROT SERPL-MCNC: 7.1 G/DL (ref 6–8.5)
RBC # BLD AUTO: 4.53 10*6/MM3 (ref 4.14–5.8)
SODIUM SERPL-SCNC: 137 MMOL/L (ref 136–145)
TRIGL SERPL-MCNC: 87 MG/DL (ref 0–150)
TSH SERPL DL<=0.05 MIU/L-ACNC: 1.48 UIU/ML (ref 0.27–4.2)
VLDLC SERPL-MCNC: 17 MG/DL (ref 5–40)
WBC NRBC COR # BLD: 4.94 10*3/MM3 (ref 3.4–10.8)

## 2023-08-15 PROCEDURE — 84443 ASSAY THYROID STIM HORMONE: CPT | Performed by: NURSE PRACTITIONER

## 2023-08-15 PROCEDURE — 80053 COMPREHEN METABOLIC PANEL: CPT | Performed by: NURSE PRACTITIONER

## 2023-08-15 PROCEDURE — 99214 OFFICE O/P EST MOD 30 MIN: CPT | Performed by: NURSE PRACTITIONER

## 2023-08-15 PROCEDURE — 80061 LIPID PANEL: CPT | Performed by: NURSE PRACTITIONER

## 2023-08-15 PROCEDURE — 3078F DIAST BP <80 MM HG: CPT | Performed by: NURSE PRACTITIONER

## 2023-08-15 PROCEDURE — 85027 COMPLETE CBC AUTOMATED: CPT | Performed by: NURSE PRACTITIONER

## 2023-08-15 PROCEDURE — 3074F SYST BP LT 130 MM HG: CPT | Performed by: NURSE PRACTITIONER

## 2023-08-15 RX ORDER — GEMFIBROZIL 600 MG/1
600 TABLET, FILM COATED ORAL
Qty: 180 TABLET | Refills: 3 | Status: SHIPPED | OUTPATIENT
Start: 2023-08-15

## 2023-08-15 RX ORDER — LISINOPRIL 2.5 MG/1
2.5 TABLET ORAL DAILY
Qty: 90 TABLET | Refills: 2 | Status: SHIPPED | OUTPATIENT
Start: 2023-08-15

## 2023-08-15 RX ORDER — SILDENAFIL CITRATE 20 MG/1
TABLET ORAL
Qty: 90 TABLET | Refills: 2 | Status: SHIPPED | OUTPATIENT
Start: 2023-08-15

## 2023-08-15 RX ORDER — ATORVASTATIN CALCIUM 20 MG/1
20 TABLET, FILM COATED ORAL NIGHTLY
Qty: 90 TABLET | Refills: 2 | Status: SHIPPED | OUTPATIENT
Start: 2023-08-15

## 2023-08-15 RX ORDER — CARVEDILOL 12.5 MG/1
12.5 TABLET ORAL 2 TIMES DAILY WITH MEALS
Qty: 180 TABLET | Refills: 3 | Status: SHIPPED | OUTPATIENT
Start: 2023-08-15

## 2023-08-15 NOTE — PROGRESS NOTES
Chief Complaint   Patient presents with    Follow-up     Pt is here for cardiac follow up.  He reports he has mild SOB with strenuous activity.  Pt denies CP, dizziness or palpitations.  Pt does take a daily ASA.      Med Refill     Pt request 90 day refills to be sent to [facility].  Medications were verified by med list.      Lab Work     Pt states their last labs were in February with [facility].         Cardiac Complaints  dyspnea      Subjective   Adair Chowdhury is a 68 y.o. male with HTN, hyperlipidemia, and palpitations (in the form of PVC) for which he wore a Holter monitor in 2001. PVCs were noted and he has been managed with beta blocker. Cardiac workup in 2014 showed moderate disease of the LAD which has been managed medically.  Repeat cardiac workup has been discussed due to length of time since last testing but he declined as he has remained asymptomatic and his co-pay is extremely high. January 2021 labs showed increase in LDL.  The dose of Lipitor was increased. Last visit, SOA with exertion noted. Repeat workup was advised. Stress done 9/2022 showed normal LV function, HTN response, and diphragmatic attenuation,home meds continued.     He comes today for follow up and reports doing well. No CP, SOA, palpitations, dizziness, or syncope are noted. He does have some SOA, but only with overexertion.Labs 2/2023 showed: , HH 14.6/44.1, Na 135, K 4.2, ALT 21, AST 22, GFR 74, HDL 32, LDL 83, TSH 1.090, AIC 5.8%.            Cardiac History  Past Surgical History:   Procedure Laterality Date    CARDIAC CATHETERIZATION  03/10/2014    Cath- EF 60%, 30% LAD.    CARDIAC CATHETERIZATION  11/10/2005    Cath- Normal including Renal    CARDIOVASCULAR STRESS TEST  10/13/2005    Stress- 8 min 96% THR. /100 Small Ischemia in Lateral Wall    CARDIOVASCULAR STRESS TEST  10/14/2009    Stress- 8 min, 97% THR. Negative, Increase Coreg to 12.5 mg BID    CARDIOVASCULAR STRESS TEST  02/24/2014    Stress- 7 min, 93% THR,  172/90. R/O Inferior Ischemia, Lisinopril added    CARDIOVASCULAR STRESS TEST  09/21/2022    5 Min.20 Sec.7.0 METS. 85% THR. 176/69. EF 55%. Diaphramatic Attenuation    CONVERTED (HISTORICAL) HOLTER  05/30/2001    Holter-Frequent PVC's    ECHO - CONVERTED  08/29/2006    Echo- Normal (Dr. Resendez)    ECHO - CONVERTED  10/14/2009    Echo-EF >60%, aortic root- 4.2cm    ECHO - CONVERTED  02/24/2014    Echo-EF 60-65%    ECHO - CONVERTED  09/21/2022    TLS.EF 55%. LA- 4.0. Trace-Mild MR. AO- 3.8       Current Outpatient Medications   Medication Sig Dispense Refill    aspirin (aspirin) 81 MG EC tablet Take 2 tablets by mouth Daily. 180 tablet 3    atorvastatin (LIPITOR) 20 MG tablet Take 1 tablet by mouth Every Night. 90 tablet 2    carvedilol (COREG) 12.5 MG tablet Take 1 tablet by mouth 2 (Two) Times a Day With Meals. 180 tablet 3    citalopram (CeleXA) 20 MG tablet Take 1 tablet by mouth once daily 90 tablet 0    gemfibrozil (LOPID) 600 MG tablet Take 1 tablet by mouth 2 (Two) Times a Day Before Meals. 180 tablet 3    nitroglycerin (NITROSTAT) 0.4 MG SL tablet 1 under the tongue as needed for angina, may repeat q5mins for up three doses 25 tablet 0    omeprazole (priLOSEC) 20 MG capsule Take 1 capsule by mouth once daily 90 capsule 0    sildenafil (REVATIO) 20 MG tablet 3 to 5 tablets as needed daily 30 minutes prior to intercourse 90 tablet 2    lisinopril (PRINIVIL,ZESTRIL) 2.5 MG tablet Take 1 tablet by mouth Daily. 90 tablet 2     No current facility-administered medications for this visit.       Patient has no known allergies.    Past Medical History:   Diagnosis Date    Abnormal pulse oximetry 03/10/2011    Overnight pulse ox- Negative    H pylori ulcer     Heart murmur     Heart murmur     History of hernia surgery     History of tonsillectomy     Hyperlipidemia     Hypertension     LV dysfunction     mild       Social History     Socioeconomic History    Marital status:    Tobacco Use    Smoking status:  "Never    Smokeless tobacco: Never   Vaping Use    Vaping Use: Never used   Substance and Sexual Activity    Alcohol use: No    Drug use: No       Family History   Problem Relation Age of Onset    Heart disease Mother     Hypertension Mother     Hyperlipidemia Mother     Heart disease Father     Heart attack Father     Hyperlipidemia Father     Hypertension Father     Heart failure Father     Diabetes Other     Hypertension Other     Hyperlipidemia Other     No Known Problems Child        Review of Systems   Constitutional: Negative for malaise/fatigue and night sweats.   Cardiovascular:  Positive for dyspnea on exertion. Negative for chest pain, claudication, irregular heartbeat, leg swelling, orthopnea, palpitations and syncope.   Respiratory:  Positive for shortness of breath. Negative for cough and wheezing.    Musculoskeletal:  Positive for stiffness. Negative for back pain and joint pain.   Gastrointestinal:  Negative for anorexia, heartburn, nausea and vomiting.   Genitourinary:  Negative for dysuria, hematuria, hesitancy and nocturia.   Neurological:  Negative for dizziness, headaches and light-headedness.   Psychiatric/Behavioral:  Negative for depression and memory loss. The patient is not nervous/anxious.          Objective     /70 (BP Location: Left arm, Patient Position: Sitting)   Pulse 68   Ht 180.3 cm (71\")   Wt 112 kg (246 lb)   BMI 34.31 kg/mý     Constitutional:       Appearance: Not in distress.   Eyes:      Pupils: Pupils are equal, round, and reactive to light.   HENT:      Nose: Nose normal.   Pulmonary:      Effort: Pulmonary effort is normal.      Breath sounds: Normal breath sounds.   Cardiovascular:      PMI at left midclavicular line. Normal rate. Regular rhythm.      Murmurs: There is a systolic murmur.   Abdominal:      Palpations: Abdomen is soft.   Musculoskeletal: Normal range of motion.      Cervical back: Normal range of motion and neck supple. Skin:     General: Skin is " warm and dry.   Neurological:      Mental Status: Alert.       Procedures         Diagnoses and all orders for this visit:    1. Coronary artery disease involving native coronary artery of native heart without angina pectoris (Primary)    2. Primary hypertension  -     CBC (No Diff); Future  -     Comprehensive Metabolic Panel; Future  -     TSH; Future  -     Lipid Panel; Future    3. Pure hypercholesterolemia  -     gemfibrozil (LOPID) 600 MG tablet; Take 1 tablet by mouth 2 (Two) Times a Day Before Meals.  Dispense: 180 tablet; Refill: 3    4. Palpitation  -     CBC (No Diff); Future  -     Comprehensive Metabolic Panel; Future  -     TSH; Future  -     Lipid Panel; Future    5. Essential hypertension  -     carvedilol (COREG) 12.5 MG tablet; Take 1 tablet by mouth 2 (Two) Times a Day With Meals.  Dispense: 180 tablet; Refill: 3    6. Other male erectile dysfunction  -     sildenafil (REVATIO) 20 MG tablet; 3 to 5 tablets as needed daily 30 minutes prior to intercourse  Dispense: 90 tablet; Refill: 2    7. Obesity (BMI 30.0-34.9)    Other orders  -     lisinopril (PRINIVIL,ZESTRIL) 2.5 MG tablet; Take 1 tablet by mouth Daily.  Dispense: 90 tablet; Refill: 2  -     atorvastatin (LIPITOR) 20 MG tablet; Take 1 tablet by mouth Every Night.  Dispense: 90 tablet; Refill: 2             CAD: ASA continued. Most recent workup showed no ischemia, with good LV function, current continued. No repeat workup advised.     HTN:  Blood pressure stable, low normal. Will decrease lisinopril to 2.5mg daily. Continue coreg at same.    Palpitations: Well controlled with coreg. Limited caffeine urged.     Hyperlipidemia: On statin therapy with lipitor and also taking lopid therapy. Last at goal. Lab order provided to reassess FLP. Limited carb diet advised.      Refills per request.     BMI noted at 34.31, good cardiac diet with limited carb, caloric intake, and walking regimen advised.      6 month follow up advised or sooner if  needed.                     Problems Addressed this Visit          Cardiac and Vasculature    Hyperlipemia    Relevant Medications    atorvastatin (LIPITOR) 20 MG tablet    gemfibrozil (LOPID) 600 MG tablet    HTN (hypertension)    Relevant Medications    lisinopril (PRINIVIL,ZESTRIL) 2.5 MG tablet    carvedilol (COREG) 12.5 MG tablet    Other Relevant Orders    CBC (No Diff)    Comprehensive Metabolic Panel    TSH    Lipid Panel    Palpitation    Relevant Orders    CBC (No Diff)    Comprehensive Metabolic Panel    TSH    Lipid Panel    Coronary artery disease involving native coronary artery of native heart without angina pectoris - Primary    Relevant Medications    carvedilol (COREG) 12.5 MG tablet    sildenafil (REVATIO) 20 MG tablet       Genitourinary and Reproductive     Other male erectile dysfunction    Relevant Medications    sildenafil (REVATIO) 20 MG tablet     Other Visit Diagnoses       Essential hypertension        Relevant Medications    lisinopril (PRINIVIL,ZESTRIL) 2.5 MG tablet    carvedilol (COREG) 12.5 MG tablet    Obesity (BMI 30.0-34.9)              Diagnoses         Codes Comments    Coronary artery disease involving native coronary artery of native heart without angina pectoris    -  Primary ICD-10-CM: I25.10  ICD-9-CM: 414.01     Primary hypertension     ICD-10-CM: I10  ICD-9-CM: 401.9     Pure hypercholesterolemia     ICD-10-CM: E78.00  ICD-9-CM: 272.0     Palpitation     ICD-10-CM: R00.2  ICD-9-CM: 785.1     Essential hypertension     ICD-10-CM: I10  ICD-9-CM: 401.9     Other male erectile dysfunction     ICD-10-CM: N52.8  ICD-9-CM: 607.84     Obesity (BMI 30.0-34.9)     ICD-10-CM: E66.9  ICD-9-CM: 278.00                           Electronically signed by Fidelia Barnard, GLADYS August 15, 2023 10:45 EDT

## 2023-10-05 DIAGNOSIS — K21.9 GERD WITHOUT ESOPHAGITIS: ICD-10-CM

## 2023-10-09 RX ORDER — CITALOPRAM 20 MG/1
TABLET ORAL
Qty: 90 TABLET | Refills: 0 | Status: SHIPPED | OUTPATIENT
Start: 2023-10-09

## 2023-10-09 RX ORDER — OMEPRAZOLE 20 MG/1
20 CAPSULE, DELAYED RELEASE ORAL DAILY
Qty: 90 CAPSULE | Refills: 0 | Status: SHIPPED | OUTPATIENT
Start: 2023-10-09

## 2024-01-03 DIAGNOSIS — K21.9 GERD WITHOUT ESOPHAGITIS: ICD-10-CM

## 2024-01-03 RX ORDER — CITALOPRAM 20 MG/1
TABLET ORAL
Qty: 90 TABLET | Refills: 0 | Status: SHIPPED | OUTPATIENT
Start: 2024-01-03

## 2024-01-03 RX ORDER — OMEPRAZOLE 20 MG/1
20 CAPSULE, DELAYED RELEASE ORAL DAILY
Qty: 90 CAPSULE | Refills: 0 | Status: SHIPPED | OUTPATIENT
Start: 2024-01-03

## 2024-03-04 ENCOUNTER — LAB (OUTPATIENT)
Dept: LAB | Facility: HOSPITAL | Age: 69
End: 2024-03-04
Payer: MEDICARE

## 2024-03-04 ENCOUNTER — OFFICE VISIT (OUTPATIENT)
Dept: CARDIOLOGY | Facility: CLINIC | Age: 69
End: 2024-03-04
Payer: MEDICARE

## 2024-03-04 VITALS
HEART RATE: 69 BPM | BODY MASS INDEX: 33.94 KG/M2 | WEIGHT: 242.4 LBS | HEIGHT: 71 IN | DIASTOLIC BLOOD PRESSURE: 80 MMHG | SYSTOLIC BLOOD PRESSURE: 118 MMHG

## 2024-03-04 DIAGNOSIS — R35.1 NOCTURIA: ICD-10-CM

## 2024-03-04 DIAGNOSIS — E66.9 OBESITY (BMI 30.0-34.9): ICD-10-CM

## 2024-03-04 DIAGNOSIS — E78.00 PURE HYPERCHOLESTEROLEMIA: ICD-10-CM

## 2024-03-04 DIAGNOSIS — I10 ESSENTIAL HYPERTENSION: Primary | ICD-10-CM

## 2024-03-04 DIAGNOSIS — K21.9 GERD WITHOUT ESOPHAGITIS: ICD-10-CM

## 2024-03-04 DIAGNOSIS — R00.2 PALPITATION: ICD-10-CM

## 2024-03-04 LAB
ALBUMIN SERPL-MCNC: 4.7 G/DL (ref 3.5–5.2)
ALBUMIN/GLOB SERPL: 1.7 G/DL
ALP SERPL-CCNC: 84 U/L (ref 39–117)
ALT SERPL W P-5'-P-CCNC: 16 U/L (ref 1–41)
ANION GAP SERPL CALCULATED.3IONS-SCNC: 13.1 MMOL/L (ref 5–15)
AST SERPL-CCNC: 18 U/L (ref 1–40)
BILIRUB SERPL-MCNC: 0.5 MG/DL (ref 0–1.2)
BUN SERPL-MCNC: 11 MG/DL (ref 8–23)
BUN/CREAT SERPL: 9.9 (ref 7–25)
CALCIUM SPEC-SCNC: 9.4 MG/DL (ref 8.6–10.5)
CHLORIDE SERPL-SCNC: 102 MMOL/L (ref 98–107)
CHOLEST SERPL-MCNC: 150 MG/DL (ref 0–200)
CO2 SERPL-SCNC: 22.9 MMOL/L (ref 22–29)
CREAT SERPL-MCNC: 1.11 MG/DL (ref 0.76–1.27)
DEPRECATED RDW RBC AUTO: 42.7 FL (ref 37–54)
EGFRCR SERPLBLD CKD-EPI 2021: 72.8 ML/MIN/1.73
ERYTHROCYTE [DISTWIDTH] IN BLOOD BY AUTOMATED COUNT: 12.4 % (ref 12.3–15.4)
GLOBULIN UR ELPH-MCNC: 2.7 GM/DL
GLUCOSE SERPL-MCNC: 109 MG/DL (ref 65–99)
HCT VFR BLD AUTO: 41.8 % (ref 37.5–51)
HDLC SERPL-MCNC: 33 MG/DL (ref 40–60)
HGB BLD-MCNC: 14 G/DL (ref 13–17.7)
LDLC SERPL CALC-MCNC: 96 MG/DL (ref 0–100)
LDLC/HDLC SERPL: 2.85 {RATIO}
MCH RBC QN AUTO: 31.3 PG (ref 26.6–33)
MCHC RBC AUTO-ENTMCNC: 33.5 G/DL (ref 31.5–35.7)
MCV RBC AUTO: 93.5 FL (ref 79–97)
PLATELET # BLD AUTO: 237 10*3/MM3 (ref 140–450)
PMV BLD AUTO: 11 FL (ref 6–12)
POTASSIUM SERPL-SCNC: 4.4 MMOL/L (ref 3.5–5.2)
PROT SERPL-MCNC: 7.4 G/DL (ref 6–8.5)
RBC # BLD AUTO: 4.47 10*6/MM3 (ref 4.14–5.8)
SODIUM SERPL-SCNC: 138 MMOL/L (ref 136–145)
TRIGL SERPL-MCNC: 115 MG/DL (ref 0–150)
TSH SERPL DL<=0.05 MIU/L-ACNC: 0.93 UIU/ML (ref 0.27–4.2)
VLDLC SERPL-MCNC: 21 MG/DL (ref 5–40)
WBC NRBC COR # BLD AUTO: 4.38 10*3/MM3 (ref 3.4–10.8)

## 2024-03-04 PROCEDURE — 3074F SYST BP LT 130 MM HG: CPT | Performed by: NURSE PRACTITIONER

## 2024-03-04 PROCEDURE — 36415 COLL VENOUS BLD VENIPUNCTURE: CPT

## 2024-03-04 PROCEDURE — 99214 OFFICE O/P EST MOD 30 MIN: CPT | Performed by: NURSE PRACTITIONER

## 2024-03-04 PROCEDURE — 85027 COMPLETE CBC AUTOMATED: CPT

## 2024-03-04 PROCEDURE — 80061 LIPID PANEL: CPT

## 2024-03-04 PROCEDURE — 84153 ASSAY OF PSA TOTAL: CPT

## 2024-03-04 PROCEDURE — 3079F DIAST BP 80-89 MM HG: CPT | Performed by: NURSE PRACTITIONER

## 2024-03-04 PROCEDURE — 84443 ASSAY THYROID STIM HORMONE: CPT

## 2024-03-04 PROCEDURE — 80053 COMPREHEN METABOLIC PANEL: CPT

## 2024-03-04 RX ORDER — LISINOPRIL 2.5 MG/1
2.5 TABLET ORAL DAILY
Qty: 90 TABLET | Refills: 2 | Status: SHIPPED | OUTPATIENT
Start: 2024-03-04

## 2024-03-04 RX ORDER — GEMFIBROZIL 600 MG/1
600 TABLET, FILM COATED ORAL
Qty: 180 TABLET | Refills: 3 | Status: SHIPPED | OUTPATIENT
Start: 2024-03-04

## 2024-03-04 RX ORDER — CITALOPRAM 20 MG/1
20 TABLET ORAL DAILY
Qty: 90 TABLET | Refills: 3 | Status: SHIPPED | OUTPATIENT
Start: 2024-03-04

## 2024-03-04 RX ORDER — CARVEDILOL 12.5 MG/1
12.5 TABLET ORAL 2 TIMES DAILY WITH MEALS
Qty: 180 TABLET | Refills: 3 | Status: SHIPPED | OUTPATIENT
Start: 2024-03-04

## 2024-03-04 RX ORDER — ATORVASTATIN CALCIUM 20 MG/1
20 TABLET, FILM COATED ORAL NIGHTLY
Qty: 90 TABLET | Refills: 2 | Status: SHIPPED | OUTPATIENT
Start: 2024-03-04

## 2024-03-04 RX ORDER — OMEPRAZOLE 20 MG/1
20 CAPSULE, DELAYED RELEASE ORAL DAILY
Qty: 90 CAPSULE | Refills: 3 | Status: SHIPPED | OUTPATIENT
Start: 2024-03-04

## 2024-03-04 NOTE — PROGRESS NOTES
Chief Complaint   Patient presents with    Follow-up     Pt is here for cardiac follow up.  Pt denies having chest pain, palpitations, SOA, and dizziness.       Med Refill     Will need omeprazole and celexa refilled. 90 day supply.     Labs Only     08/15/2023 last in chart.        Cardiac Complaints  none      Subjective   Adair Chowdhury is a 68 y.o. male with HTN, hyperlipidemia, and palpitations (in the form of PVC) for which he wore a Holter monitor in 2001. PVCs were noted and he has been managed with beta blocker. Cardiac workup in 2014 showed moderate disease of the LAD which has been managed medically.  Repeat cardiac workup has been discussed due to length of time since last testing but he declined as he has remained asymptomatic and his co-pay is extremely high. January 2021 labs showed increase in LDL.  The dose of Lipitor was increased. Last visit, SOA with exertion noted. Repeat workup was advised. Stress done 9/2022 showed normal LV function, HTN response, and diphragmatic attenuation,home meds continued.      He comes today for follow up and denies new concerns. No CP, SOA, palpitations, dizziness, or syncope noted. Labs 8/2023 showed: HH 14.1/44.5, BUN 13, Creatinine 1.09, Na 137, K 4.6, ALT 18, AST 19, GFR 74, TSH 1.480, HDL 41, LDL 92, TRIG 87. Refills requested.       Cardiac History  Past Surgical History:   Procedure Laterality Date    CARDIAC CATHETERIZATION  03/10/2014    Cath- EF 60%, 30% LAD.    CARDIAC CATHETERIZATION  11/10/2005    Cath- Normal including Renal    CARDIOVASCULAR STRESS TEST  10/13/2005    Stress- 8 min 96% THR. /100 Small Ischemia in Lateral Wall    CARDIOVASCULAR STRESS TEST  10/14/2009    Stress- 8 min, 97% THR. Negative, Increase Coreg to 12.5 mg BID    CARDIOVASCULAR STRESS TEST  02/24/2014    Stress- 7 min, 93% THR, 172/90. R/O Inferior Ischemia, Lisinopril added    CARDIOVASCULAR STRESS TEST  09/21/2022    5 Min.20 Sec.7.0 METS. 85% THR. 176/69. EF 55%.  Diaphramatic Attenuation    CONVERTED (HISTORICAL) HOLTER  05/30/2001    Holter-Frequent PVC's    ECHO - CONVERTED  08/29/2006    Echo- Normal (Dr. Resendez)    ECHO - CONVERTED  10/14/2009    Echo-EF >60%, aortic root- 4.2cm    ECHO - CONVERTED  02/24/2014    Echo-EF 60-65%    ECHO - CONVERTED  09/21/2022    TLS.EF 55%. LA- 4.0. Trace-Mild MR. AO- 3.8       Current Outpatient Medications   Medication Sig Dispense Refill    aspirin (aspirin) 81 MG EC tablet Take 2 tablets by mouth Daily. 180 tablet 3    atorvastatin (LIPITOR) 20 MG tablet Take 1 tablet by mouth Every Night. 90 tablet 2    carvedilol (COREG) 12.5 MG tablet Take 1 tablet by mouth 2 (Two) Times a Day With Meals. 180 tablet 3    citalopram (CeleXA) 20 MG tablet Take 1 tablet by mouth Daily. 90 tablet 3    gemfibrozil (LOPID) 600 MG tablet Take 1 tablet by mouth 2 (Two) Times a Day Before Meals. 180 tablet 3    lisinopril (PRINIVIL,ZESTRIL) 2.5 MG tablet Take 1 tablet by mouth Daily. 90 tablet 2    nitroglycerin (NITROSTAT) 0.4 MG SL tablet 1 under the tongue as needed for angina, may repeat q5mins for up three doses 25 tablet 0    omeprazole (priLOSEC) 20 MG capsule Take 1 capsule by mouth Daily. 90 capsule 3    sildenafil (REVATIO) 20 MG tablet 3 to 5 tablets as needed daily 30 minutes prior to intercourse 90 tablet 2     No current facility-administered medications for this visit.       Patient has no known allergies.    Past Medical History:   Diagnosis Date    Abnormal pulse oximetry 03/10/2011    Overnight pulse ox- Negative    H pylori ulcer     Heart murmur     Heart murmur     History of hernia surgery     History of tonsillectomy     Hyperlipidemia     Hypertension     LV dysfunction     mild       Social History     Socioeconomic History    Marital status:    Tobacco Use    Smoking status: Never    Smokeless tobacco: Never   Vaping Use    Vaping status: Never Used   Substance and Sexual Activity    Alcohol use: No    Drug use: No  "      Family History   Problem Relation Age of Onset    Heart disease Mother     Hypertension Mother     Hyperlipidemia Mother     Heart disease Father     Heart attack Father     Hyperlipidemia Father     Hypertension Father     Heart failure Father     Diabetes Other     Hypertension Other     Hyperlipidemia Other     No Known Problems Child        Review of Systems   Constitutional: Negative for malaise/fatigue and night sweats.   Cardiovascular:  Negative for chest pain, claudication, irregular heartbeat, leg swelling, near-syncope, orthopnea, palpitations and syncope.   Respiratory:  Negative for cough, shortness of breath and wheezing.    Musculoskeletal:  Negative for back pain, joint pain and stiffness.   Gastrointestinal:  Negative for anorexia, dysphagia, nausea and vomiting.   Genitourinary:  Negative for dysuria, hematuria, hesitancy and nocturia.   Neurological:  Negative for dizziness, headaches and light-headedness.   Psychiatric/Behavioral:  Negative for depression and memory loss. The patient is not nervous/anxious.            Objective     /80 (BP Location: Right arm, Patient Position: Sitting, Cuff Size: Adult)   Pulse 69   Ht 180.3 cm (71\")   Wt 110 kg (242 lb 6.4 oz)   BMI 33.81 kg/m²     Constitutional:       Appearance: Not in distress.   Eyes:      Pupils: Pupils are equal, round, and reactive to light.   HENT:      Nose: Nose normal.   Pulmonary:      Effort: Pulmonary effort is normal.      Breath sounds: Normal breath sounds.   Cardiovascular:      PMI at left midclavicular line. Normal rate. Regular rhythm.      Murmurs: There is a systolic murmur.   Abdominal:      Palpations: Abdomen is soft.   Musculoskeletal: Normal range of motion.      Cervical back: Normal range of motion and neck supple. Skin:     General: Skin is warm and dry.   Neurological:      Mental Status: Alert.         Procedures         Diagnoses and all orders for this visit:    1. Essential hypertension " (Primary)  -     carvedilol (COREG) 12.5 MG tablet; Take 1 tablet by mouth 2 (Two) Times a Day With Meals.  Dispense: 180 tablet; Refill: 3    2. Palpitation  -     CBC (No Diff); Future  -     Comprehensive Metabolic Panel; Future  -     TSH; Future  -     Lipid Panel; Future    3. GERD without esophagitis  -     omeprazole (priLOSEC) 20 MG capsule; Take 1 capsule by mouth Daily.  Dispense: 90 capsule; Refill: 3    4. Pure hypercholesterolemia  -     CBC (No Diff); Future  -     Comprehensive Metabolic Panel; Future  -     TSH; Future  -     Lipid Panel; Future  -     gemfibrozil (LOPID) 600 MG tablet; Take 1 tablet by mouth 2 (Two) Times a Day Before Meals.  Dispense: 180 tablet; Refill: 3    5. Nocturia  -     PSA DIAGNOSTIC; Future    6. Obesity (BMI 30.0-34.9)    Other orders  -     citalopram (CeleXA) 20 MG tablet; Take 1 tablet by mouth Daily.  Dispense: 90 tablet; Refill: 3  -     atorvastatin (LIPITOR) 20 MG tablet; Take 1 tablet by mouth Every Night.  Dispense: 90 tablet; Refill: 2  -     lisinopril (PRINIVIL,ZESTRIL) 2.5 MG tablet; Take 1 tablet by mouth Daily.  Dispense: 90 tablet; Refill: 2             CAD: ASA continued. No repeat workup advised, status stable.     HTN:  Blood pressure stable, improved with lower lisinopril dosing. Continue coreg at same.     Palpitations: Well controlled with coreg. Limited caffeine urged.     Hyperlipidemia: On statin therapy with lipitor and also taking lopid therapy. Last lipids at goal. Lab order provided to reassess FLP. Limited carb diet advised.      Refills per request.     BMI noted at 33.81, good cardiac diet with limited carb, caloric intake, and walking regimen advised.      6 month follow up advised or sooner if needed.            Problems Addressed this Visit          Cardiac and Vasculature    Hyperlipemia    Relevant Medications    atorvastatin (LIPITOR) 20 MG tablet    gemfibrozil (LOPID) 600 MG tablet    Other Relevant Orders    CBC (No Diff)     Comprehensive Metabolic Panel    TSH    Lipid Panel    Palpitation    Relevant Orders    CBC (No Diff)    Comprehensive Metabolic Panel    TSH    Lipid Panel       Gastrointestinal Abdominal     GERD without esophagitis    Relevant Medications    omeprazole (priLOSEC) 20 MG capsule     Other Visit Diagnoses       Essential hypertension    -  Primary    Relevant Medications    carvedilol (COREG) 12.5 MG tablet    lisinopril (PRINIVIL,ZESTRIL) 2.5 MG tablet    Nocturia        Relevant Orders    PSA DIAGNOSTIC    Obesity (BMI 30.0-34.9)              Diagnoses         Codes Comments    Essential hypertension    -  Primary ICD-10-CM: I10  ICD-9-CM: 401.9     Palpitation     ICD-10-CM: R00.2  ICD-9-CM: 785.1     GERD without esophagitis     ICD-10-CM: K21.9  ICD-9-CM: 530.81     Pure hypercholesterolemia     ICD-10-CM: E78.00  ICD-9-CM: 272.0     Nocturia     ICD-10-CM: R35.1  ICD-9-CM: 788.43     Obesity (BMI 30.0-34.9)     ICD-10-CM: E66.9  ICD-9-CM: 278.00                     Electronically signed by Fidelia Barnard, APRN March 4, 2024 11:23 EST

## 2024-03-05 LAB — PSA SERPL-MCNC: 0.66 NG/ML (ref 0–4)

## 2024-09-04 ENCOUNTER — OFFICE VISIT (OUTPATIENT)
Dept: CARDIOLOGY | Facility: CLINIC | Age: 69
End: 2024-09-04
Payer: MEDICARE

## 2024-09-04 VITALS
DIASTOLIC BLOOD PRESSURE: 80 MMHG | HEART RATE: 64 BPM | HEIGHT: 71 IN | BODY MASS INDEX: 33.88 KG/M2 | SYSTOLIC BLOOD PRESSURE: 118 MMHG | WEIGHT: 242 LBS

## 2024-09-04 DIAGNOSIS — E78.00 PURE HYPERCHOLESTEROLEMIA: ICD-10-CM

## 2024-09-04 DIAGNOSIS — R00.2 PALPITATION: ICD-10-CM

## 2024-09-04 DIAGNOSIS — I10 ESSENTIAL HYPERTENSION: Primary | ICD-10-CM

## 2024-09-04 DIAGNOSIS — E66.9 OBESITY (BMI 30.0-34.9): ICD-10-CM

## 2024-09-04 DIAGNOSIS — I25.10 CORONARY ARTERY DISEASE INVOLVING NATIVE CORONARY ARTERY OF NATIVE HEART WITHOUT ANGINA PECTORIS: ICD-10-CM

## 2024-09-04 DIAGNOSIS — K21.9 GERD WITHOUT ESOPHAGITIS: ICD-10-CM

## 2024-09-04 RX ORDER — LISINOPRIL 2.5 MG/1
2.5 TABLET ORAL DAILY
Qty: 90 TABLET | Refills: 2 | Status: SHIPPED | OUTPATIENT
Start: 2024-09-04

## 2024-09-04 RX ORDER — ATORVASTATIN CALCIUM 20 MG/1
20 TABLET, FILM COATED ORAL NIGHTLY
Qty: 90 TABLET | Refills: 2 | Status: SHIPPED | OUTPATIENT
Start: 2024-09-04

## 2024-09-04 NOTE — PROGRESS NOTES
Chief Complaint   Patient presents with    Follow-up     Cardiac management    Lab     Last labs in chart.     Dizziness     A couple months ago he got up to go to restroom became nauseous, dizzy, fell in floor, and had cold sweat. Since that episode had a few memory problems.     Med Refill     Will need refills on atorvastatin and lisinopril-90 day        HPI:  HPI   Adair Chowdhury is a 69 y.o. male with HTN, hyperlipidemia, and palpitations (in the form of PVC) for which he wore a Holter monitor in 2001. PVCs were noted and he has been managed with beta blocker. Cardiac workup in 2014 showed moderate disease of the LAD which has been managed medically.  Repeat cardiac workup has been discussed due to length of time since last testing but he declined as he has remained asymptomatic and his co-pay is extremely high. January 2021 labs showed increase in LDL.  The dose of Lipitor was increased. Last visit, SOA with exertion noted. Repeat workup was advised. Stress done 9/2022 showed normal LV function, HTN response, and diphragmatic attenuation,home meds continued.       He comes today for follow up. Labs with PCP 3/2024: , trig 115, HDL 33, LDL 96, normal kidney function and liver enzymes, elevated fasting glucose. Patient denies chest pain, pressure, palpitations, tightness, dizziness, shortness of air. He had one episode of dizziness, nausea and a fall a couple of months ago after rising from a seated position. He admits to possible dehydration and felt better after drinking water.        Cardiac History:    Past Surgical History:   Procedure Laterality Date    CARDIAC CATHETERIZATION  03/10/2014    Cath- EF 60%, 30% LAD.    CARDIAC CATHETERIZATION  11/10/2005    Cath- Normal including Renal    CARDIOVASCULAR STRESS TEST  10/13/2005    Stress- 8 min 96% THR. /100 Small Ischemia in Lateral Wall    CARDIOVASCULAR STRESS TEST  10/14/2009    Stress- 8 min, 97% THR. Negative, Increase Coreg to 12.5 mg BID     CARDIOVASCULAR STRESS TEST  02/24/2014    Stress- 7 min, 93% THR, 172/90. R/O Inferior Ischemia, Lisinopril added    CARDIOVASCULAR STRESS TEST  09/21/2022    5 Min.20 Sec.7.0 METS. 85% THR. 176/69. EF 55%. Diaphramatic Attenuation    CONVERTED (HISTORICAL) HOLTER  05/30/2001    Holter-Frequent PVC's    ECHO - CONVERTED  08/29/2006    Echo- Normal (Dr. Resendez)    ECHO - CONVERTED  10/14/2009    Echo-EF >60%, aortic root- 4.2cm    ECHO - CONVERTED  02/24/2014    Echo-EF 60-65%    ECHO - CONVERTED  09/21/2022    TLS.EF 55%. LA- 4.0. Trace-Mild MR. AO- 3.8       Current Outpatient Medications   Medication Sig Dispense Refill    aspirin (aspirin) 81 MG EC tablet Take 2 tablets by mouth Daily. 180 tablet 3    atorvastatin (LIPITOR) 20 MG tablet Take 1 tablet by mouth Every Night. 90 tablet 2    carvedilol (COREG) 12.5 MG tablet Take 1 tablet by mouth 2 (Two) Times a Day With Meals. 180 tablet 3    citalopram (CeleXA) 20 MG tablet Take 1 tablet by mouth Daily. 90 tablet 3    gemfibrozil (LOPID) 600 MG tablet Take 1 tablet by mouth 2 (Two) Times a Day Before Meals. 180 tablet 3    lisinopril (PRINIVIL,ZESTRIL) 2.5 MG tablet Take 1 tablet by mouth Daily. 90 tablet 2    nitroglycerin (NITROSTAT) 0.4 MG SL tablet 1 under the tongue as needed for angina, may repeat q5mins for up three doses 25 tablet 0    omeprazole (priLOSEC) 20 MG capsule Take 1 capsule by mouth Daily. 90 capsule 3    sildenafil (REVATIO) 20 MG tablet 3 to 5 tablets as needed daily 30 minutes prior to intercourse 90 tablet 2     No current facility-administered medications for this visit.       Patient has no known allergies.    Past Medical History:   Diagnosis Date    Abnormal pulse oximetry 03/10/2011    Overnight pulse ox- Negative    H pylori ulcer     Heart murmur     Heart murmur     History of hernia surgery     History of tonsillectomy     Hyperlipidemia     Hypertension     LV dysfunction     mild       Social History     Socioeconomic History  "   Marital status:    Tobacco Use    Smoking status: Never     Passive exposure: Current    Smokeless tobacco: Never   Vaping Use    Vaping status: Never Used   Substance and Sexual Activity    Alcohol use: No    Drug use: No    Sexual activity: Defer       Family History   Problem Relation Age of Onset    Heart disease Mother     Hypertension Mother     Hyperlipidemia Mother     Heart disease Father     Heart attack Father     Hyperlipidemia Father     Hypertension Father     Heart failure Father     Diabetes Other     Hypertension Other     Hyperlipidemia Other     No Known Problems Child        Vitals:   /80 (BP Location: Right arm, Patient Position: Sitting)   Pulse 64   Ht 180.3 cm (70.98\")   Wt 110 kg (242 lb)   BMI 33.77 kg/m²     Physical Exam  Vitals and nursing note reviewed.   Constitutional:       Appearance: He is obese.   Neck:      Vascular: No carotid bruit.   Cardiovascular:      Rate and Rhythm: Normal rate and regular rhythm.      Pulses: Normal pulses.      Heart sounds: Normal heart sounds. No murmur heard.     No friction rub. No gallop.   Pulmonary:      Effort: Pulmonary effort is normal.      Breath sounds: Normal breath sounds and air entry.   Musculoskeletal:      Right lower leg: No edema.      Left lower leg: No edema.   Skin:     General: Skin is warm and dry.      Capillary Refill: Capillary refill takes less than 2 seconds.   Neurological:      Mental Status: He is alert and oriented to person, place, and time.         ECG 12 Lead    Date/Time: 9/4/2024 9:26 AM  Performed by: Azra Dewitt APRN    Authorized by: Azra Dewitt APRN  Rhythm: sinus rhythm  Rate: normal  BPM: 64  Other findings: non-specific ST-T wave changes    Clinical impression: non-specific ECG  Comments: Qtc 439 ms           Assessment & Plan     Diagnoses and all orders for this visit:    1. Essential hypertension (Primary)  -     ECG 12 Lead  -     lisinopril (PRINIVIL,ZESTRIL) 2.5 MG tablet; Take " 1 tablet by mouth Daily.  Dispense: 90 tablet; Refill: 2    2. Pure hypercholesterolemia  -     atorvastatin (LIPITOR) 20 MG tablet; Take 1 tablet by mouth Every Night.  Dispense: 90 tablet; Refill: 2    3. Palpitation  -     ECG 12 Lead    4. GERD without esophagitis    5. Obesity (BMI 30.0-34.9)    6. Coronary artery disease involving native coronary artery of native heart without angina pectoris    Hypertension  - BP controlled  - Continue carvedilol 12.5 mg, lisinopril 2.5 mg    Hypercholesteremia  - Labs managed with PCP  - Continue Lopid 600 mg and atorvastatin 20 mg    CAD  - Continue aspirin and statin therapy  - Stable    Stable from cardiac standpoint. No further testing recommended at this time. No med changes made today.  Encouraged him to hydrate (goal 4 bottle per day).  EKG in office today showed normal sinus rhythm with ST elevation that was probably old, rate 64 bpm, normal QTc interval    Visit Diagnoses:    ICD-10-CM ICD-9-CM   1. Essential hypertension  I10 401.9   2. Pure hypercholesterolemia  E78.00 272.0   3. Palpitation  R00.2 785.1   4. GERD without esophagitis  K21.9 530.81   5. Obesity (BMI 30.0-34.9)  E66.9 278.00   6. Coronary artery disease involving native coronary artery of native heart without angina pectoris  I25.10 414.01       Meds Ordered During Visit:  New Medications Ordered This Visit   Medications    atorvastatin (LIPITOR) 20 MG tablet     Sig: Take 1 tablet by mouth Every Night.     Dispense:  90 tablet     Refill:  2     Dose increase    lisinopril (PRINIVIL,ZESTRIL) 2.5 MG tablet     Sig: Take 1 tablet by mouth Daily.     Dispense:  90 tablet     Refill:  2       Follow Up Appointment:   Return in about 6 months (around 3/4/2025), or if symptoms worsen or fail to improve.           This document has been electronically signed by GLADYS Montero  September 4, 2024 14:47 EDT    Dictated Utilizing Dragon Dictation: Part of this note may be an electronic  transcription/translation of spoken language to printed text using the Dragon Dictation System.

## 2025-03-12 ENCOUNTER — OFFICE VISIT (OUTPATIENT)
Dept: CARDIOLOGY | Facility: CLINIC | Age: 70
End: 2025-03-12
Payer: MEDICARE

## 2025-03-12 VITALS
HEIGHT: 71 IN | WEIGHT: 234.2 LBS | SYSTOLIC BLOOD PRESSURE: 120 MMHG | DIASTOLIC BLOOD PRESSURE: 72 MMHG | HEART RATE: 76 BPM | BODY MASS INDEX: 32.79 KG/M2

## 2025-03-12 DIAGNOSIS — I10 ESSENTIAL HYPERTENSION: ICD-10-CM

## 2025-03-12 DIAGNOSIS — I25.10 CORONARY ARTERY DISEASE INVOLVING NATIVE CORONARY ARTERY OF NATIVE HEART WITHOUT ANGINA PECTORIS: Primary | ICD-10-CM

## 2025-03-12 DIAGNOSIS — N52.8 OTHER MALE ERECTILE DYSFUNCTION: ICD-10-CM

## 2025-03-12 DIAGNOSIS — R00.2 PALPITATION: ICD-10-CM

## 2025-03-12 DIAGNOSIS — I10 PRIMARY HYPERTENSION: ICD-10-CM

## 2025-03-12 DIAGNOSIS — E78.00 PURE HYPERCHOLESTEROLEMIA: ICD-10-CM

## 2025-03-12 DIAGNOSIS — E66.811 OBESITY (BMI 30.0-34.9): ICD-10-CM

## 2025-03-12 DIAGNOSIS — R06.02 SHORTNESS OF BREATH: ICD-10-CM

## 2025-03-12 DIAGNOSIS — E78.5 HYPERLIPIDEMIA LDL GOAL <100: ICD-10-CM

## 2025-03-12 RX ORDER — CARVEDILOL 12.5 MG/1
12.5 TABLET ORAL 2 TIMES DAILY WITH MEALS
Qty: 180 TABLET | Refills: 3 | Status: SHIPPED | OUTPATIENT
Start: 2025-03-12

## 2025-03-12 RX ORDER — LISINOPRIL 2.5 MG/1
2.5 TABLET ORAL DAILY
Qty: 90 TABLET | Refills: 2 | Status: SHIPPED | OUTPATIENT
Start: 2025-03-12

## 2025-03-12 RX ORDER — GEMFIBROZIL 600 MG/1
600 TABLET, FILM COATED ORAL
Qty: 180 TABLET | Refills: 3 | Status: SHIPPED | OUTPATIENT
Start: 2025-03-12

## 2025-03-12 RX ORDER — ATORVASTATIN CALCIUM 20 MG/1
20 TABLET, FILM COATED ORAL NIGHTLY
Qty: 90 TABLET | Refills: 2 | Status: SHIPPED | OUTPATIENT
Start: 2025-03-12

## 2025-03-12 NOTE — PROGRESS NOTES
Chief Complaint   Patient presents with    Follow-up     Cardiac management    Lab     Last labs in chart from PCP.    Shortness of Breath     Only notices when walking up stairs or over exerting.    Med Refill     Needs refills on medications-90 day       Cardiac Complaints  dyspnea      Subjective   Adair Chowdhury is a 69 y.o. male with HTN, hyperlipidemia, and palpitations (in the form of PVC) for which he wore a Holter monitor in 2001. PVCs were noted and he has been managed with beta blocker. Cardiac workup in 2014 showed moderate disease of the LAD which has been managed medically.  Repeat cardiac workup has been discussed due to length of time since last testing but he declined as he has remained asymptomatic and his co-pay is extremely high. January 2021 labs showed increase in LDL.  The dose of Lipitor was increased. Last visit, SOA with exertion noted. Repeat workup was advised. Stress done 9/2022 showed normal LV function, HTN response, and diphragmatic attenuation,home meds continued.     He comes today for follow up and denies new concerns. He does admit to SOA with overexertion/climbing, but admits he does well otherwise. Labs 2/2025 showed: HH 14/43, BUN 10, Creatinine 1.1, GFR 67, Na 139, K 4.7, HDL 38, LDL 84, TRIG 85. Refills requested.          Cardiac History  Past Surgical History:   Procedure Laterality Date    CARDIAC CATHETERIZATION  03/10/2014    Cath- EF 60%, 30% LAD.    CARDIAC CATHETERIZATION  11/10/2005    Cath- Normal including Renal    CARDIOVASCULAR STRESS TEST  10/13/2005    Stress- 8 min 96% THR. /100 Small Ischemia in Lateral Wall    CARDIOVASCULAR STRESS TEST  10/14/2009    Stress- 8 min, 97% THR. Negative, Increase Coreg to 12.5 mg BID    CARDIOVASCULAR STRESS TEST  02/24/2014    Stress- 7 min, 93% THR, 172/90. R/O Inferior Ischemia, Lisinopril added    CARDIOVASCULAR STRESS TEST  09/21/2022    5 Min.20 Sec.7.0 METS. 85% THR. 176/69. EF 55%. Diaphramatic Attenuation     CONVERTED (HISTORICAL) HOLTER  05/30/2001    Holter-Frequent PVC's    ECHO - CONVERTED  08/29/2006    Echo- Normal (Dr. Resendez)    ECHO - CONVERTED  10/14/2009    Echo-EF >60%, aortic root- 4.2cm    ECHO - CONVERTED  02/24/2014    Echo-EF 60-65%    ECHO - CONVERTED  09/21/2022    TLS.EF 55%. LA- 4.0. Trace-Mild MR. AO- 3.8       Current Outpatient Medications   Medication Sig Dispense Refill    aspirin (aspirin) 81 MG EC tablet Take 2 tablets by mouth Daily. 180 tablet 3    atorvastatin (LIPITOR) 20 MG tablet Take 1 tablet by mouth Every Night. 90 tablet 2    carvedilol (COREG) 12.5 MG tablet Take 1 tablet by mouth 2 (Two) Times a Day With Meals. 180 tablet 3    citalopram (CeleXA) 20 MG tablet Take 1 tablet by mouth Daily. 90 tablet 3    gemfibrozil (LOPID) 600 MG tablet Take 1 tablet by mouth 2 (Two) Times a Day Before Meals. 180 tablet 3    lisinopril (PRINIVIL,ZESTRIL) 2.5 MG tablet Take 1 tablet by mouth Daily. 90 tablet 2    nitroglycerin (NITROSTAT) 0.4 MG SL tablet 1 under the tongue as needed for angina, may repeat q5mins for up three doses 25 tablet 0    omeprazole (priLOSEC) 20 MG capsule Take 1 capsule by mouth Daily. 90 capsule 3    sildenafil (REVATIO) 20 MG tablet 3 to 5 tablets as needed daily 30 minutes prior to intercourse 90 tablet 2     No current facility-administered medications for this visit.       Patient has no known allergies.    Past Medical History:   Diagnosis Date    Abnormal pulse oximetry 03/10/2011    Overnight pulse ox- Negative    H pylori ulcer     Heart murmur     Heart murmur     History of hernia surgery     History of tonsillectomy     Hyperlipidemia     Hypertension     LV dysfunction     mild       Social History     Socioeconomic History    Marital status:    Tobacco Use    Smoking status: Never     Passive exposure: Current    Smokeless tobacco: Never   Vaping Use    Vaping status: Never Used   Substance and Sexual Activity    Alcohol use: No    Drug use: No     "Sexual activity: Defer       Family History   Problem Relation Age of Onset    Heart disease Mother     Hypertension Mother     Hyperlipidemia Mother     Heart disease Father     Heart attack Father     Hyperlipidemia Father     Hypertension Father     Heart failure Father     Diabetes Other     Hypertension Other     Hyperlipidemia Other     No Known Problems Child        Review of Systems   Constitutional: Negative for malaise/fatigue and night sweats.   Cardiovascular:  Positive for dyspnea on exertion. Negative for chest pain, claudication, irregular heartbeat, leg swelling, near-syncope, orthopnea, palpitations and syncope.   Respiratory:  Positive for shortness of breath. Negative for cough and wheezing.    Musculoskeletal:  Positive for stiffness. Negative for back pain and joint pain.   Gastrointestinal:  Negative for anorexia, heartburn, nausea and vomiting.   Genitourinary:  Negative for dysuria, hematuria, hesitancy and nocturia.   Neurological:  Negative for dizziness, light-headedness and loss of balance.   Psychiatric/Behavioral:  Negative for depression and memory loss. The patient is not nervous/anxious.            Objective     /72 (BP Location: Right arm, Patient Position: Sitting)   Pulse 76   Ht 180.3 cm (70.98\")   Wt 106 kg (234 lb 3.2 oz)   BMI 32.68 kg/m²     Constitutional:       Appearance: Not in distress.   Eyes:      Pupils: Pupils are equal, round, and reactive to light.   HENT:      Nose: Nose normal.   Pulmonary:      Effort: Pulmonary effort is normal.      Breath sounds: Normal breath sounds.   Cardiovascular:      PMI at left midclavicular line. Normal rate. Regular rhythm.      Murmurs: There is a systolic murmur.   Abdominal:      Palpations: Abdomen is soft.   Musculoskeletal: Normal range of motion.      Cervical back: Normal range of motion and neck supple. Skin:     General: Skin is warm and dry.   Neurological:      Mental Status: Alert.         Procedures     "     Diagnoses and all orders for this visit:    1. Coronary artery disease involving native coronary artery of native heart without angina pectoris (Primary)    2. Primary hypertension    3. Palpitation    4. Shortness of breath    5. Hyperlipidemia LDL goal <100    6. Essential hypertension  -     carvedilol (COREG) 12.5 MG tablet; Take 1 tablet by mouth 2 (Two) Times a Day With Meals.  Dispense: 180 tablet; Refill: 3  -     lisinopril (PRINIVIL,ZESTRIL) 2.5 MG tablet; Take 1 tablet by mouth Daily.  Dispense: 90 tablet; Refill: 2    7. Other male erectile dysfunction    8. Pure hypercholesterolemia  -     atorvastatin (LIPITOR) 20 MG tablet; Take 1 tablet by mouth Every Night.  Dispense: 90 tablet; Refill: 2  -     gemfibrozil (LOPID) 600 MG tablet; Take 1 tablet by mouth 2 (Two) Times a Day Before Meals.  Dispense: 180 tablet; Refill: 3    9. Obesity (BMI 30.0-34.9)             CAD: ASA continued. No repeat workup advised, status stable.     HTN:  Blood pressure stable, improved with lower lisinopril dosing. Continue coreg at same.     Palpitations: Well controlled with coreg. Limited caffeine urged.     Hyperlipidemia: On statin therapy with lipitor and also taking lopid therapy. Last lipids at goal. Lab order provided to reassess FLP. Limited carb diet advised.      Refills per request.     BMI noted at 32.68, good cardiac diet with limited carb, caloric intake, and walking regimen advised.      6 month follow up advised or sooner if needed.                  Problems Addressed this Visit          Cardiac and Vasculature    Hyperlipemia    Relevant Medications    atorvastatin (LIPITOR) 20 MG tablet    gemfibrozil (LOPID) 600 MG tablet    HTN (hypertension)    Relevant Medications    carvedilol (COREG) 12.5 MG tablet    lisinopril (PRINIVIL,ZESTRIL) 2.5 MG tablet    Palpitation    Coronary artery disease involving native coronary artery of native heart without angina pectoris - Primary    Relevant Medications     carvedilol (COREG) 12.5 MG tablet       Genitourinary and Reproductive     Other male erectile dysfunction     Other Visit Diagnoses         Shortness of breath          Hyperlipidemia LDL goal <100        Relevant Medications    atorvastatin (LIPITOR) 20 MG tablet    gemfibrozil (LOPID) 600 MG tablet      Essential hypertension        Relevant Medications    carvedilol (COREG) 12.5 MG tablet    lisinopril (PRINIVIL,ZESTRIL) 2.5 MG tablet      Obesity (BMI 30.0-34.9)              Diagnoses         Codes Comments      Coronary artery disease involving native coronary artery of native heart without angina pectoris    -  Primary ICD-10-CM: I25.10  ICD-9-CM: 414.01       Primary hypertension     ICD-10-CM: I10  ICD-9-CM: 401.9       Palpitation     ICD-10-CM: R00.2  ICD-9-CM: 785.1       Shortness of breath     ICD-10-CM: R06.02  ICD-9-CM: 786.05       Hyperlipidemia LDL goal <100     ICD-10-CM: E78.5  ICD-9-CM: 272.4       Essential hypertension     ICD-10-CM: I10  ICD-9-CM: 401.9       Other male erectile dysfunction     ICD-10-CM: N52.8  ICD-9-CM: 607.84       Pure hypercholesterolemia     ICD-10-CM: E78.00  ICD-9-CM: 272.0       Obesity (BMI 30.0-34.9)     ICD-10-CM: E66.811  ICD-9-CM: 278.00                             Electronically signed by Fidelia Barnard, APRN March 12, 2025 09:32 EDT

## 2025-03-17 DIAGNOSIS — K21.9 GERD WITHOUT ESOPHAGITIS: ICD-10-CM

## 2025-03-20 RX ORDER — CITALOPRAM HYDROBROMIDE 20 MG/1
20 TABLET ORAL DAILY
Qty: 90 TABLET | Refills: 3 | Status: SHIPPED | OUTPATIENT
Start: 2025-03-20

## 2025-03-20 RX ORDER — OMEPRAZOLE 20 MG/1
20 CAPSULE, DELAYED RELEASE ORAL DAILY
Qty: 90 CAPSULE | Refills: 3 | Status: SHIPPED | OUTPATIENT
Start: 2025-03-20